# Patient Record
Sex: MALE
[De-identification: names, ages, dates, MRNs, and addresses within clinical notes are randomized per-mention and may not be internally consistent; named-entity substitution may affect disease eponyms.]

---

## 2018-12-11 ENCOUNTER — HOSPITAL ENCOUNTER (INPATIENT)
Dept: HOSPITAL 88 - ER | Age: 73
LOS: 12 days | Discharge: HOME | DRG: 271 | End: 2018-12-23
Attending: INTERNAL MEDICINE | Admitting: INTERNAL MEDICINE
Payer: COMMERCIAL

## 2018-12-11 VITALS — SYSTOLIC BLOOD PRESSURE: 135 MMHG | DIASTOLIC BLOOD PRESSURE: 72 MMHG

## 2018-12-11 VITALS — SYSTOLIC BLOOD PRESSURE: 134 MMHG | DIASTOLIC BLOOD PRESSURE: 72 MMHG

## 2018-12-11 VITALS — WEIGHT: 194 LBS | BODY MASS INDEX: 28.73 KG/M2 | HEIGHT: 69 IN

## 2018-12-11 VITALS — DIASTOLIC BLOOD PRESSURE: 72 MMHG | SYSTOLIC BLOOD PRESSURE: 135 MMHG

## 2018-12-11 DIAGNOSIS — E11.42: ICD-10-CM

## 2018-12-11 DIAGNOSIS — E11.51: Primary | ICD-10-CM

## 2018-12-11 DIAGNOSIS — L03.031: ICD-10-CM

## 2018-12-11 DIAGNOSIS — Z85.038: ICD-10-CM

## 2018-12-11 DIAGNOSIS — I10: ICD-10-CM

## 2018-12-11 DIAGNOSIS — B96.5: ICD-10-CM

## 2018-12-11 DIAGNOSIS — L97.519: ICD-10-CM

## 2018-12-11 DIAGNOSIS — Z79.4: ICD-10-CM

## 2018-12-11 DIAGNOSIS — Z79.02: ICD-10-CM

## 2018-12-11 DIAGNOSIS — Z79.82: ICD-10-CM

## 2018-12-11 DIAGNOSIS — B96.89: ICD-10-CM

## 2018-12-11 DIAGNOSIS — L03.115: ICD-10-CM

## 2018-12-11 DIAGNOSIS — B95.2: ICD-10-CM

## 2018-12-11 DIAGNOSIS — I70.235: ICD-10-CM

## 2018-12-11 DIAGNOSIS — E11.621: ICD-10-CM

## 2018-12-11 DIAGNOSIS — E78.5: ICD-10-CM

## 2018-12-11 DIAGNOSIS — L02.611: ICD-10-CM

## 2018-12-11 LAB
ALBUMIN SERPL-MCNC: 3.9 G/DL (ref 3.5–5)
ALBUMIN/GLOB SERPL: 1 {RATIO} (ref 0.8–2)
ALP SERPL-CCNC: 74 IU/L (ref 40–150)
ALT SERPL-CCNC: 22 IU/L (ref 0–55)
ANION GAP SERPL CALC-SCNC: 17.1 MMOL/L (ref 8–16)
BASOPHILS # BLD AUTO: 0.1 10*3/UL (ref 0–0.1)
BASOPHILS NFR BLD AUTO: 0.5 % (ref 0–1)
BUN SERPL-MCNC: 25 MG/DL (ref 7–26)
BUN/CREAT SERPL: 21 (ref 6–25)
CALCIUM SERPL-MCNC: 10.2 MG/DL (ref 8.4–10.2)
CHLORIDE SERPL-SCNC: 97 MMOL/L (ref 98–107)
CHOLEST SERPL-MCNC: 168 MD/DL (ref 0–199)
CHOLEST/HDLC SERPL: 2.7 {RATIO} (ref 3.9–4.7)
CO2 SERPL-SCNC: 26 MMOL/L (ref 22–29)
DEPRECATED NEUTROPHILS # BLD AUTO: 7.2 10*3/UL (ref 2.1–6.9)
EGFRCR SERPLBLD CKD-EPI 2021: > 60 ML/MIN (ref 60–?)
EOSINOPHIL # BLD AUTO: 0.2 10*3/UL (ref 0–0.4)
EOSINOPHIL NFR BLD AUTO: 1.6 % (ref 0–6)
ERYTHROCYTE [DISTWIDTH] IN CORD BLOOD: 12.5 % (ref 11.7–14.4)
GLOBULIN PLAS-MCNC: 4.1 G/DL (ref 2.3–3.5)
GLUCOSE SERPLBLD-MCNC: 113 MG/DL (ref 74–118)
HCT VFR BLD AUTO: 44.3 % (ref 38.2–49.6)
HDLC SERPL-MSCNC: 62 MG/DL (ref 40–60)
HGB BLD-MCNC: 14.8 G/DL (ref 14–18)
LDLC SERPL CALC-MCNC: 86 MG/DL (ref 60–130)
LYMPHOCYTES # BLD: 1.1 10*3/UL (ref 1–3.2)
LYMPHOCYTES NFR BLD AUTO: 11.8 % (ref 18–39.1)
MCH RBC QN AUTO: 30.1 PG (ref 28–32)
MCHC RBC AUTO-ENTMCNC: 33.4 G/DL (ref 31–35)
MCV RBC AUTO: 90.2 FL (ref 81–99)
MONOCYTES # BLD AUTO: 1 10*3/UL (ref 0.2–0.8)
MONOCYTES NFR BLD AUTO: 10.4 % (ref 4.4–11.3)
NEUTS SEG NFR BLD AUTO: 75.4 % (ref 38.7–80)
PLATELET # BLD AUTO: 243 X10E3/UL (ref 140–360)
POTASSIUM SERPL-SCNC: 4.1 MMOL/L (ref 3.5–5.1)
RBC # BLD AUTO: 4.91 X10E6/UL (ref 4.3–5.7)
SODIUM SERPL-SCNC: 136 MMOL/L (ref 136–145)
TRIGL SERPL-MCNC: 101 MG/DL (ref 0–149)

## 2018-12-11 PROCEDURE — 80202 ASSAY OF VANCOMYCIN: CPT

## 2018-12-11 PROCEDURE — 87205 SMEAR GRAM STAIN: CPT

## 2018-12-11 PROCEDURE — 80048 BASIC METABOLIC PNL TOTAL CA: CPT

## 2018-12-11 PROCEDURE — 87071 CULTURE AEROBIC QUANT OTHER: CPT

## 2018-12-11 PROCEDURE — 75716 ARTERY X-RAYS ARMS/LEGS: CPT

## 2018-12-11 PROCEDURE — 85025 COMPLETE CBC W/AUTO DIFF WBC: CPT

## 2018-12-11 PROCEDURE — 36247 INS CATH ABD/L-EXT ART 3RD: CPT

## 2018-12-11 PROCEDURE — 82948 REAGENT STRIP/BLOOD GLUCOSE: CPT

## 2018-12-11 PROCEDURE — 80053 COMPREHEN METABOLIC PANEL: CPT

## 2018-12-11 PROCEDURE — 37232: CPT

## 2018-12-11 PROCEDURE — 75625 CONTRAST EXAM ABDOMINL AORTA: CPT

## 2018-12-11 PROCEDURE — 96372 THER/PROPH/DIAG INJ SC/IM: CPT

## 2018-12-11 PROCEDURE — 37229: CPT

## 2018-12-11 PROCEDURE — 85651 RBC SED RATE NONAUTOMATED: CPT

## 2018-12-11 PROCEDURE — 87040 BLOOD CULTURE FOR BACTERIA: CPT

## 2018-12-11 PROCEDURE — 83605 ASSAY OF LACTIC ACID: CPT

## 2018-12-11 PROCEDURE — 99284 EMERGENCY DEPT VISIT MOD MDM: CPT

## 2018-12-11 PROCEDURE — 80061 LIPID PANEL: CPT

## 2018-12-11 PROCEDURE — 96367 TX/PROPH/DG ADDL SEQ IV INF: CPT

## 2018-12-11 PROCEDURE — 36415 COLL VENOUS BLD VENIPUNCTURE: CPT

## 2018-12-11 PROCEDURE — 87186 SC STD MICRODIL/AGAR DIL: CPT

## 2018-12-11 PROCEDURE — 96361 HYDRATE IV INFUSION ADD-ON: CPT

## 2018-12-11 PROCEDURE — 87075 CULTR BACTERIA EXCEPT BLOOD: CPT

## 2018-12-11 PROCEDURE — 93926 LOWER EXTREMITY STUDY: CPT

## 2018-12-11 PROCEDURE — 37186 SEC ART THROMBECTOMY ADD-ON: CPT

## 2018-12-11 PROCEDURE — 83036 HEMOGLOBIN GLYCOSYLATED A1C: CPT

## 2018-12-11 RX ADMIN — VANCOMYCIN HYDROCHLORIDE SCH MLS/HR: 1 INJECTION, SOLUTION INTRAVENOUS at 20:44

## 2018-12-11 RX ADMIN — INSULIN LISPRO SCH UNIT: 100 INJECTION, SOLUTION INTRAVENOUS; SUBCUTANEOUS at 21:30

## 2018-12-11 RX ADMIN — TAZOBACTAM SODIUM AND PIPERACILLIN SODIUM SCH MLS/HR: 375; 3 INJECTION, SOLUTION INTRAVENOUS at 18:00

## 2018-12-11 RX ADMIN — SODIUM CHLORIDE SCH MLS/HR: 9 INJECTION, SOLUTION INTRAVENOUS at 18:00

## 2018-12-11 NOTE — NUR
PT ARRIVED AT  THE UNIT FROM ER [facility]RETCHER WITH  C/O R .GREAT TOE PAIN. UNSTAGEABLE 
INFECTED WOUND NOTED @ R.GREAT TOE.NO PAIN VOICED.RED DISCOLORATION NOTED @ 
R.FOOT.ASSESSMENT DONE.NO RESP.DISTRESS.ORIENTED THE PT TO THE UNIT.RECEIVED ORDERS TO GET 
RENEW THE HOME MEDICATION.INFORMED THE CONSULTS.FAMILY MEMBER AT BED SIDE.BED LOCKED AND IN 
LOWEST POSITION.PHONE AND CALL LIGHT WITHIN REACH.INSTRUCTED TO CALL FOR ASSISTANCE AS 
NEEDED.KEEP MONITORING THE PT.

## 2018-12-11 NOTE — XMS REPORT
Patient Summary Document

                             Created on: 2018



DOMINGUEZ YU

External Reference #: [id number]

: 1945

Sex: Male



Demographics







                          Address                   [address] [place] [address]

 

                          Home Phone                [phone]

 

                          Preferred Language        Unknown

 

                          Marital Status            Unknown

 

                          Orthodoxy Affiliation     Unknown

 

                          Race                      Unknown

 

                          Ethnic Group              Unknown





Author







                          Author                    [facility]

 

                          Address                   Unknown

 

                          Phone                     Unavailable







Care Team Providers







                    Care Team Member Name    Role                Phone

 

                    JORY ROMERO    Unavailable         Unavailable







Problems

This patient has no known problems.



Allergies, Adverse Reactions, Alerts

This patient has no known allergies or adverse reactions.



Medications

This patient has no known medications.



Results







           Test Description    Test Time    Test Comments    Text Results    Atomic Results    Result

 Comments

 

                FOOT RIGHT COMPLETE    2018 14:52:00                                                       

                                                   [place] [address]      Patient Name: DOMINGUEZ YU                                
  MR #: [id number]                     : 1945                          
        Age/Sex: 73/M  Acct #: [id number]                              Req #: 
[id number]  Adm Physician:                                                      
Ordered by: LUZ MARIA ROMERO MD                            Report #: [id number]
       Location: ER                                      Room/Bed:              
      
___________________________________________________________________________________________________
   Procedure: [id number] DX/FOOT RIGHT COMPLETE  Exam Date:                      
      Exam Time:                                               REPORT STATUS: 
Signed       Exam:  Right foot, 3 views      History:  Diabetic foot ulcer great
toe      Comparison: None.      Findings: No acute, displaced fracture or 
dislocation. There is cortical   erosion of the subungual tuft of the first 
distal phalanx. Joint spaces are   well-maintained. Soft tissues are notable for
mild the first digit soft tissue   swelling and atherosclerotic vascular 
calcifications.      Impression:      Soft tissue swelling with osteomyelitis of
the distal first phalanx.         Signed by: Dr. Artemio Morse M.D. on 
2018 2:54 PM        Dictated By: ARTEMIO MORSE MD  Electronically Signed 
By: ARTEMIO MORSE MD on 18  Transcribed By: MARY on 18 
     COPY TO:   LUZ MARIA ROMERO MD

## 2018-12-11 NOTE — DIAGNOSTIC IMAGING REPORT
Exam:  Right foot, 3 views



History:  Diabetic foot ulcer great toe



Comparison: None.



Findings: No acute, displaced fracture or dislocation. There is cortical

erosion of the subungual tuft of the first distal phalanx. Joint spaces are

well-maintained. Soft tissues are notable for mild the first digit soft tissue

swelling and atherosclerotic vascular calcifications.



Impression:



Soft tissue swelling with osteomyelitis of the distal first phalanx.





Signed by: Dr. Myke Hernandez M.D. on 12/11/2018 2:54 PM

## 2018-12-11 NOTE — NUR
The patient is AA&Ox4.  The  was contacted, to request a visit with the 
family.  The  will see the patient in the AM first thing.  The patient 
is eating a meal as well.  The admitting MD is at the bedside to consult with 
the patient.  All fluids and zosyn given as ordered.  The patient refused 
narcotics and anti-emetics.  Pt states he is not hurting or nauseous.

## 2018-12-12 VITALS — DIASTOLIC BLOOD PRESSURE: 68 MMHG | SYSTOLIC BLOOD PRESSURE: 125 MMHG

## 2018-12-12 VITALS — SYSTOLIC BLOOD PRESSURE: 122 MMHG | DIASTOLIC BLOOD PRESSURE: 73 MMHG

## 2018-12-12 VITALS — SYSTOLIC BLOOD PRESSURE: 125 MMHG | DIASTOLIC BLOOD PRESSURE: 68 MMHG

## 2018-12-12 VITALS — SYSTOLIC BLOOD PRESSURE: 132 MMHG | DIASTOLIC BLOOD PRESSURE: 73 MMHG

## 2018-12-12 VITALS — SYSTOLIC BLOOD PRESSURE: 127 MMHG | DIASTOLIC BLOOD PRESSURE: 67 MMHG

## 2018-12-12 VITALS — DIASTOLIC BLOOD PRESSURE: 58 MMHG | SYSTOLIC BLOOD PRESSURE: 117 MMHG

## 2018-12-12 VITALS — DIASTOLIC BLOOD PRESSURE: 67 MMHG | SYSTOLIC BLOOD PRESSURE: 127 MMHG

## 2018-12-12 LAB
ALBUMIN SERPL-MCNC: 3.2 G/DL (ref 3.5–5)
ALBUMIN/GLOB SERPL: 1 {RATIO} (ref 0.8–2)
ALP SERPL-CCNC: 62 IU/L (ref 40–150)
ALT SERPL-CCNC: 18 IU/L (ref 0–55)
ANION GAP SERPL CALC-SCNC: 15.4 MMOL/L (ref 8–16)
BASOPHILS # BLD AUTO: 0 10*3/UL (ref 0–0.1)
BASOPHILS NFR BLD AUTO: 0.2 % (ref 0–1)
BUN SERPL-MCNC: 22 MG/DL (ref 7–26)
BUN/CREAT SERPL: 21 (ref 6–25)
CALCIUM SERPL-MCNC: 9.1 MG/DL (ref 8.4–10.2)
CHLORIDE SERPL-SCNC: 99 MMOL/L (ref 98–107)
CO2 SERPL-SCNC: 23 MMOL/L (ref 22–29)
DEPRECATED NEUTROPHILS # BLD AUTO: 6.3 10*3/UL (ref 2.1–6.9)
EGFRCR SERPLBLD CKD-EPI 2021: > 60 ML/MIN (ref 60–?)
EOSINOPHIL # BLD AUTO: 0.2 10*3/UL (ref 0–0.4)
EOSINOPHIL NFR BLD AUTO: 2.1 % (ref 0–6)
ERYTHROCYTE [DISTWIDTH] IN CORD BLOOD: 12.4 % (ref 11.7–14.4)
GLOBULIN PLAS-MCNC: 3.3 G/DL (ref 2.3–3.5)
GLUCOSE SERPLBLD-MCNC: 171 MG/DL (ref 74–118)
HCT VFR BLD AUTO: 39.2 % (ref 38.2–49.6)
HGB BLD-MCNC: 13.1 G/DL (ref 14–18)
LYMPHOCYTES # BLD: 0.9 10*3/UL (ref 1–3.2)
LYMPHOCYTES NFR BLD AUTO: 11 % (ref 18–39.1)
MCH RBC QN AUTO: 30 PG (ref 28–32)
MCHC RBC AUTO-ENTMCNC: 33.4 G/DL (ref 31–35)
MCV RBC AUTO: 89.9 FL (ref 81–99)
MONOCYTES # BLD AUTO: 0.8 10*3/UL (ref 0.2–0.8)
MONOCYTES NFR BLD AUTO: 9.6 % (ref 4.4–11.3)
NEUTS SEG NFR BLD AUTO: 76.9 % (ref 38.7–80)
PLATELET # BLD AUTO: 193 X10E3/UL (ref 140–360)
POTASSIUM SERPL-SCNC: 4.4 MMOL/L (ref 3.5–5.1)
RBC # BLD AUTO: 4.36 X10E6/UL (ref 4.3–5.7)
SODIUM SERPL-SCNC: 133 MMOL/L (ref 136–145)

## 2018-12-12 RX ADMIN — VANCOMYCIN HYDROCHLORIDE SCH MLS/HR: 1 INJECTION, SOLUTION INTRAVENOUS at 19:32

## 2018-12-12 RX ADMIN — INSULIN LISPRO SCH UNIT: 100 INJECTION, SOLUTION INTRAVENOUS; SUBCUTANEOUS at 12:30

## 2018-12-12 RX ADMIN — SODIUM CHLORIDE SCH MLS/HR: 9 INJECTION, SOLUTION INTRAVENOUS at 00:38

## 2018-12-12 RX ADMIN — TAZOBACTAM SODIUM AND PIPERACILLIN SODIUM SCH MLS/HR: 375; 3 INJECTION, SOLUTION INTRAVENOUS at 02:00

## 2018-12-12 RX ADMIN — LOSARTAN POTASSIUM SCH MG: 25 TABLET, FILM COATED ORAL at 21:00

## 2018-12-12 RX ADMIN — LEVOTHYROXINE SODIUM SCH MCG: 50 TABLET ORAL at 05:40

## 2018-12-12 RX ADMIN — TAZOBACTAM SODIUM AND PIPERACILLIN SODIUM SCH MLS/HR: 375; 3 INJECTION, SOLUTION INTRAVENOUS at 10:00

## 2018-12-12 RX ADMIN — Medication SCH MG: at 21:00

## 2018-12-12 RX ADMIN — INSULIN LISPRO SCH UNIT: 100 INJECTION, SOLUTION INTRAVENOUS; SUBCUTANEOUS at 11:30

## 2018-12-12 RX ADMIN — INSULIN ASPART SCH UNITS: 100 INJECTION, SUSPENSION SUBCUTANEOUS at 16:30

## 2018-12-12 RX ADMIN — PRIMIDONE SCH MG: 50 TABLET ORAL at 10:00

## 2018-12-12 RX ADMIN — INSULIN LISPRO SCH UNIT: 100 INJECTION, SOLUTION INTRAVENOUS; SUBCUTANEOUS at 16:30

## 2018-12-12 RX ADMIN — INSULIN ASPART SCH UNITS: 100 INJECTION, SUSPENSION SUBCUTANEOUS at 10:00

## 2018-12-12 RX ADMIN — INSULIN ASPART SCH UNITS: 100 INJECTION, SUSPENSION SUBCUTANEOUS at 21:00

## 2018-12-12 RX ADMIN — INSULIN LISPRO SCH UNIT: 100 INJECTION, SOLUTION INTRAVENOUS; SUBCUTANEOUS at 21:00

## 2018-12-12 RX ADMIN — INSULIN LISPRO SCH UNIT: 100 INJECTION, SOLUTION INTRAVENOUS; SUBCUTANEOUS at 07:30

## 2018-12-12 RX ADMIN — PRIMIDONE SCH MG: 50 TABLET ORAL at 18:21

## 2018-12-12 RX ADMIN — TAZOBACTAM SODIUM AND PIPERACILLIN SODIUM SCH MLS/HR: 375; 3 INJECTION, SOLUTION INTRAVENOUS at 18:21

## 2018-12-12 RX ADMIN — VANCOMYCIN HYDROCHLORIDE SCH MLS/HR: 1 INJECTION, SOLUTION INTRAVENOUS at 05:40

## 2018-12-12 NOTE — CONSULTATION
DATE OF CONSULTATION:  December 11, 2018 



PODIATRY CONSULTATION 



REASON FOR CONSULTATION:  Pain to the right foot with pregangrenous changes 

noted to the right great toe.



HISTORY OF PRESENT ILLNESS:  This is a pleasant 73-year-old  male 

with a history of insulin-dependent diabetes, hypertension, 

hypercholesterolemia, who relates that a piece of metal fell on his right 

great toe 3 weeks ago.  He ignored it due to the fact that he has no 

feeling.  The foot started hurting and the right great toe started smelling 

very bad according to the wife who was present.  He is currently denying 

any history of fever, chills, nausea or vomiting.  He does relate some 

discomfort to the forefoot aspect of the right foot overlying the distal 

aspect of the right great toe and also medial aspect of the 1st 

metatarsophalangeal joint.



PAST MEDICAL HISTORY:  As described above.  Insulin-dependent diabetes, 

hypertension, hypercholesterolemia.



PAST SURGICAL HISTORY:  Remarkable for a partial resection of colon 

secondary to colon cancer.



ALLERGIES:  PATIENT DENIES.



SOCIAL HISTORY:  Denies any smoking, drinking or recreational drug use.  

Lives with his current wife.  Has 5 kids on a previous marriage.



FAMILY HISTORY:  Remarkable for diabetes.



CURRENT MEDICATIONS:  Note listed in the chart including IV vancomycin and 

Zosyn.  Also on morphine sulfate 4 mg IV piggyback every 4 to 6 hours 

p.r.n. pain.



REVIEW OF SYSTEMS:   

CARDIAC:  He is denying any palpitations or arrhythmias. 

RESPIRATORY:  Denies any shortness of breath, productive cough. 

GASTROINTESTINAL:  Denies any diarrhea, constipation. 

GENITOURINARY:  Denies hematuria or problems voiding. 

 

VITALS:  Afebrile, pulse rate 86, respirations 17, blood pressure 135/72, 

O2 saturation 99%.



LABS:  Noted.  Has a white blood cell count of 9.5, hemoglobin 14.8, 

hematocrit 44.3 with a platelet count of 243.



PODIATRIC PHYSICAL EXAMINATION:  Reveals the following. 

VASCULATURE:  Pedal pulses to both the dorsalis pedis and posterior tibial 

arteries are barely palpable.  Skin temperature between warm and cool to 

touch. 

NEUROLOGICAL  Reveals a complete loss of protective sensation when 

utilizing [employer] 5.07 monofilament wire. 

MUSCULOSKELETAL  Reveals muscle mass to be symmetrical, muscle strength to 

be 4 to 5 out of 5 to all muscle groups.  Has some erythema surrounding the 

1st metatarsophalangeal joint with pain upon palpation. 

DERMATOLOGICAL  Reveals pregangrenous changes with foul smell noted with a 

discolored right great toe and swollen when compared to contralateral side. 





X RAYS:  Were reviewed, revealing possible osteolysis to the distal phalanx 

which may be consistent with osteomyelitis.  May also have a stress 

fracture secondary to a contusion.  Has cyst degeneration overlying the 1st 

metatarsal head medially with calcification of vessels noted.



ASSESSMENT:  Peripheral arterial disease with cellulitis to the dorsal 

aspect of the right foot.  Pregangrenous changes noted.  Diabetic 

neuropathy with mild hallux valgus deformity.



PLAN:  Will start diluted wet-to-dry Betadine dressing.  Noninvasive 

arterial Doppler studies/ultrasound will be reviewed.  Patient will need to 

have an MRI.  Will treat him conservatively.  Patient understands if not 

responsive to conservative treatment and depending on his arterial 

ultrasound, may need angioplasties before any surgery is attempted.  

Patient understands that there is a possibility of losing the toe if it 

does not respond to IV and local wound care for now.  



 





DD:  12/12/2018 09:11

DT:  12/12/2018 09:50

Job#:  [id number] SCOTT

## 2018-12-12 NOTE — NUR
ASSESSMENT: Spiritual Distress

Referred by ED RN. Pt is scared and feels abandoned by sons. Pt states his mother suffered 
from the same illness and had bilateral amputation. Pt afraid the same will happen to him. 
Pt states his sons have stopped communicating with him. Pt expressed emotions thru words and 
tears.



Intervention:  Provided hospitality and empathic listening. Facilitated identification of 
emotions. Provided prayer.



Outcome: Pt expressed appreciation for visit. Will follow as able.





YULIA RAO



Spiritual Care Department

O: [phone]

Pager: [phone] ([phone])

## 2018-12-12 NOTE — HISTORY AND PHYSICAL
_______ ulcer and right foot cellulitis.



HISTORY OF PRESENT ILLNESS:  A 73-year-old man with a long history of 

diabetes, on insulin and oral hypoglycemic medications.  The patient has 

diabetic neuropathy.  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 

dropped on his right foot a couple of weeks ago and worsened.  He did not 

notice the fact that his right great toe developed an ulcer.  It is 

infected.  There is some necrotic tissue.  The patient was admitted at this 

for _______.  The patient is otherwise stable.  _______ _______.



PAST MEDICAL HISTORY:  Diabetes, on insulin therapy, hypertension, 

hypothyroidism, diabetic neuropathy.



PAST SURGICAL HISTORY:  Partial wound resection approximately 3 years ago 

for _______, radiation and chemotherapy.



ALLERGIES:  NO KNOWN ALLERGIES.



MEDICATIONS:  List reviewed.  Gabapentin, levothyroxine _______, _______.



PHYSICAL EXAMINATION

VITAL SIGNS:  Temperature is 98, blood pressure 138/74, pulse rate of 92, 

respirations 18. _______ _______.

HEENT:  Head is normocephalic and atraumatic.

NECK:  Supple grossly.

PULMONARY:  Clear.

CARDIOVASCULAR:  Regular rate and rhythm.  

ABDOMEN:  Soft. 

EXTREMITIES:  Right greater toe infected with ulcer with necrotic tissue.  

Right foot cellulitis as well.  Pulses intact.

NEUROLOGIC:  Diabetic neuropathy _______.





LABORATORY STUDIES:  Sodium is 133, potassium 4.1, chloride 97, bicarb 26, 

BUN 25, creatinine 1.1, glucose 113.  WBC is 9.95, hemoglobin 14.8, 

hematocrit 44.6, and platelets of 243,000.  



Sed rate is 41.  



Right foot x-ray shows soft tissue swelling with osteomyelitis of the 

distal 1st phalanx.



IMPRESSION 

1.  Right 1st phalanx right great toe osteomyelitis with infected diabetic 

foot ulcer with necrotic tissue and swelling:  There is also right foot 

cellulitis as well.

2.  Diabetes, type 2:  On insulin therapy.



PLAN:  IV antibiotics.  Tighter control of blood sugar.  MRI of the right 

foot.  If confirmed, the patient will need right great toe amputation.  

_______ consulted. 









DD:  12/12/2018 08:43

DT:  12/12/2018 08:49

Job#:  [id number]

## 2018-12-12 NOTE — NUR
Nutrition Screen Note



RD Recommendation for Physician: 

-Continue ADA diet as ordered

-Pt and family are not interested in diet education  12/12



Plan of Care: RD following, monitoring for tolerance and adequacy



Nutrition reason for involvement:

Nutrition risk trigger  MST 



Primary Diagnose(s):

1.Peripheral arterial disease with cellulitis to the dorsal aspect of the right foot.  

2.Pregangrenous changes noted.  

3.Diabetic neuropathy with mild hallux valgus deformity.



PMH: Diabetes, on insulin therapy, hypertension, hypothyroidism, diabetic neuropathy.



Ht: 69in 

Wt: 190lb

BMI: 28.1kg/m2

IBW: 160lb



RD Assessment:

(12/12) Chart reviewed. Labs and meds reviewed. 74 yo M, who is admitted for R foot 
cellulitis and infection. During my visit, pt reports good appetite and denies eating less 
than usual PTA. No GI complains noted. LBM  12/12. Pt denies any chewing or swallowing 
difficulty. No recent weight loss reported. Pt has had hx of DM for over 30years and has 
been f/u with his endocrinologist for DM management. Pt and family are not interested in any 
further education. Will continue to monitor and follow. 



Current Diet: ADA diet 



Malnutrition Evaluation (12/12/2018)

The patient does not meet criteria for a specified degree of malnutrition at this time. Will 
re-evaluate at follow-up as appropriate. 



Diet Education Needs Assessment:

Diet education indicated, pt and family are not interested. 





Nutrition Care Level: low 





Signed: Tiffanie Valverde, MS, RD, LD

## 2018-12-12 NOTE — NUR
Completed rounds with morning nurse at bedside. Pt stable lying in bed 45 degrees. Call bell 
within reach. Family at bedside. No acute distress noted.

## 2018-12-12 NOTE — DIAGNOSTIC IMAGING REPORT
TECHNIQUE:

Magnetic resonance imaging of the RIGHT foot (forefoot) was performed WITHOUT

injected contrast. 



HISTORY: Right great toe osteomyelitis, pain, heavy object fell on toe, one

week ago, per the provided clinical note, signs of gangrene right great toe 

COMPARISON: Right foot radiographs December 11, 2018



DISCUSSION: 

Bone:  

Diffuse edema involving the distal phalanx of the great toe with multiple

intrinsic heterogeneity (series 4 image 13 and series 5 image 13), but in

general relative preservation of the fatty marrow signal.



Joints:  

Mild degenerative changes of the first metatarsophalangeal joint.

No effusion.



Soft Tissues:  

Soft tissue defect at the distal medial aspect of the great toe. 

Less than expected edema of the great toe soft tissues. 

Enhancement characteristics cannot be assessed.

Edema and atrophy of the intrinsic muscles of the foot.





IMPRESSION: 

First distal phalanx findings are somewhat nonspecific, the absence of

intravenous contrast limits this evaluation. Differential considerations

include a comminuted nondisplaced fracture of the distal phalanx without

superimposed osteomyelitis or gangrene of the distal great toe with mummified

fat throughout the distal phalanx.  If further imaging characterization is

desired and the patient can receive intravenous contrast, follow-up MRI images

of the great toe with and without contrast would be helpful.



Signed by: Dr. Gregory Olson D.O., M.M.M. on 12/12/2018 12:14 PM

## 2018-12-12 NOTE — PROGRESS NOTE
DATE:  December 12, 2018 



SUBJECTIVE:  Patient was seen at bedside accompanied by wife.  Doing 

somewhat better.  Denies any history of fever, chills, nausea, or vomiting. 

 



OBJECTIVE   

VITALS:  Afebrile, pulse rate 77, respirations 18, blood pressure 125/68, 

O2 saturation 96%. 

EXTREMITIES:  Still some foul smell noted to the right great toe.  Some 

discoloration.  The cellulitis of the dorsal aspect of the right foot seems 

to be resolving with the IV antibiotics.  Has some pain overlying the 1st 

metatarsophalangeal joint, right foot when compared to the left.



LABS:  Show white blood cells dropping to 8.2, hemoglobin 13.1 with a 

platelet count of 193,000.  



ASSESSMENT

1.  Possible osteomyelitis.  

2.  Pregangrenous changes noted.  

3.  Peripheral artery disease with diabetic neuropathy and 

capsulitis/hallux valgus deformity.



PLAN:  Will continue dilute wet-to-dry Betadine.  Continue IV antibiotics.  

MRI will be ordered and will be done today.  Will treat conservatively for 

now.  Patient understands possible surgical intervention.  Will let the 

foot demarcate before any surgery is attempted to try to salvage as much as 

possible.  







DD:  12/12/2018 09:13

DT:  12/12/2018 09:24

Job#:  [id number]

## 2018-12-13 VITALS — SYSTOLIC BLOOD PRESSURE: 122 MMHG | DIASTOLIC BLOOD PRESSURE: 58 MMHG

## 2018-12-13 VITALS — DIASTOLIC BLOOD PRESSURE: 68 MMHG | SYSTOLIC BLOOD PRESSURE: 130 MMHG

## 2018-12-13 VITALS — SYSTOLIC BLOOD PRESSURE: 130 MMHG | DIASTOLIC BLOOD PRESSURE: 71 MMHG

## 2018-12-13 VITALS — SYSTOLIC BLOOD PRESSURE: 130 MMHG | DIASTOLIC BLOOD PRESSURE: 63 MMHG

## 2018-12-13 VITALS — SYSTOLIC BLOOD PRESSURE: 141 MMHG | DIASTOLIC BLOOD PRESSURE: 70 MMHG

## 2018-12-13 VITALS — DIASTOLIC BLOOD PRESSURE: 73 MMHG | SYSTOLIC BLOOD PRESSURE: 135 MMHG

## 2018-12-13 RX ADMIN — TAZOBACTAM SODIUM AND PIPERACILLIN SODIUM SCH MLS/HR: 375; 3 INJECTION, SOLUTION INTRAVENOUS at 10:17

## 2018-12-13 RX ADMIN — PRIMIDONE SCH MG: 50 TABLET ORAL at 10:17

## 2018-12-13 RX ADMIN — LEVOTHYROXINE SODIUM SCH MCG: 50 TABLET ORAL at 06:34

## 2018-12-13 RX ADMIN — SODIUM CHLORIDE SCH MLS/HR: 9 INJECTION, SOLUTION INTRAVENOUS at 16:35

## 2018-12-13 RX ADMIN — ENOXAPARIN SODIUM SCH MG: 40 INJECTION SUBCUTANEOUS at 17:38

## 2018-12-13 RX ADMIN — SODIUM CHLORIDE SCH MLS/HR: 9 INJECTION, SOLUTION INTRAVENOUS at 00:00

## 2018-12-13 RX ADMIN — INSULIN ASPART SCH UNITS: 100 INJECTION, SUSPENSION SUBCUTANEOUS at 21:00

## 2018-12-13 RX ADMIN — VANCOMYCIN HYDROCHLORIDE SCH MLS/HR: 1 INJECTION, SOLUTION INTRAVENOUS at 06:34

## 2018-12-13 RX ADMIN — TAZOBACTAM SODIUM AND PIPERACILLIN SODIUM SCH MLS/HR: 375; 3 INJECTION, SOLUTION INTRAVENOUS at 17:57

## 2018-12-13 RX ADMIN — INSULIN LISPRO SCH UNIT: 100 INJECTION, SOLUTION INTRAVENOUS; SUBCUTANEOUS at 11:30

## 2018-12-13 RX ADMIN — LOSARTAN POTASSIUM SCH MG: 25 TABLET, FILM COATED ORAL at 21:00

## 2018-12-13 RX ADMIN — METOPROLOL SUCCINATE SCH MG: 25 TABLET, EXTENDED RELEASE ORAL at 16:33

## 2018-12-13 RX ADMIN — PRIMIDONE SCH MG: 50 TABLET ORAL at 17:38

## 2018-12-13 RX ADMIN — INSULIN LISPRO SCH UNIT: 100 INJECTION, SOLUTION INTRAVENOUS; SUBCUTANEOUS at 07:30

## 2018-12-13 RX ADMIN — Medication SCH MG: at 21:00

## 2018-12-13 RX ADMIN — Medication SCH MG: at 16:33

## 2018-12-13 RX ADMIN — INSULIN LISPRO SCH UNIT: 100 INJECTION, SOLUTION INTRAVENOUS; SUBCUTANEOUS at 16:30

## 2018-12-13 RX ADMIN — TAZOBACTAM SODIUM AND PIPERACILLIN SODIUM SCH MLS/HR: 375; 3 INJECTION, SOLUTION INTRAVENOUS at 02:58

## 2018-12-13 RX ADMIN — VANCOMYCIN HYDROCHLORIDE SCH MLS/HR: 1 INJECTION, SOLUTION INTRAVENOUS at 18:36

## 2018-12-13 RX ADMIN — ASPIRIN 81 MG CHEWABLE TABLET SCH MG: 81 TABLET CHEWABLE at 16:32

## 2018-12-13 RX ADMIN — INSULIN LISPRO SCH UNIT: 100 INJECTION, SOLUTION INTRAVENOUS; SUBCUTANEOUS at 21:00

## 2018-12-13 NOTE — NUR
REPORT RECEIVED FROM OFF GOING NURSE ,PT RESTING IN BED ALERT AND ORIENTED, NO DISTRESS 
NOTED, DENIES NEEDS, CALL LIGHT IN REACH, SITTER AT BEDSIDE

## 2018-12-13 NOTE — NUR
PATIENT IS BEEN TRANSFER TO #[id number], REPORT GIVEN TO THE RECEIVING NURSE. ALL PERSONAL TAKEN 
WITH THE PATIENT, HE LEFT UNIT PER WHEELCHAIR, ACCOMPANIED BY STAFF.

## 2018-12-13 NOTE — NUR
PT RECEIVED FROM MED SURG 1 VIA W/C , AAOX4, ORIENTED TO HIS ROOM, NO S/S OF DISTRESS NOTED, 
DENIES PAIN. RT AC 20G, VANCOMYCIN INFUSING, IV SITE CLEAN AND DRY. RT GREAT TOE CELLULITIS 
NOTED. GENERAL SKIN INTACT. IN ROOM WITH SPOUSE, CALL LIGHT WITHIN REACH.

## 2018-12-13 NOTE — NUR
WET TO DRY WOUND CARE WITH BETADINE SOLUTION APPLIED TO THE RIGHT GREAT TOE, PATIENT 
TOLERATED PROCEDURE WELL, HE DENIES PAIN. ODOR NOTED TO THE TOE WHILE PROVIDING CARE; CALL 
LIGHT WITHIN EASY REACH, INSTRUCTED TO CALL FOR ASSISTANCE AS NEEDED.

## 2018-12-13 NOTE — CONSULTATION
DATE OF CONSULTATION:  December 13, 2018 



CARDIOLOGY CONSULTATION



REASON FOR CONSULTATION:  Peripheral arterial disease.



HISTORY OF PRESENT ILLNESS:  Mr. Pope is a pleasant 73-year-old man 

with history of dyslipidemia, hypertension, neuropathy, who presents to [facility] with right first toe erythema, black 

discoloration, foul smell, associated to blunt trauma while walking.  In 

the setting of a neuropathy, he did not realize he developed an ulcer for 

an unclear amount of time and has been dealing with this for at least 

2 weeks.  He underwent unilateral arterial Doppler of the right lower 

extremity revealing monophasic waveforms of the anterior tibial and 

posterior tibial arteries as well as elevated velocities on the right 

anterior tibial concerning for outflow disease.  He has received initial 

antibiotic therapy as well as IV fluids, initially presenting with elevated 

lactic acid concerning for sepsis with improvement with initial therapy.  

He is being evaluated by podiatry with plans for additional surgical 

revision of the infected foot.  Underwent MRI study today, please see 

separate report.



TWELVE-SYSTEM REVIEW:  Negative except for as noted above.



ALLERGIES:  NO KNOWN DRUG ALLERGIES.



PAST MEDICAL HISTORY:  Diabetes, hypertension, dyslipidemia, and 

neuropathy.



SOCIAL HISTORY:  No active smoking, alcohol, or drugs.



FAMILY HISTORY:  Noncontributory.



PHYSICAL EXAMINATION:

VITAL SIGNS:  Temperature is 97.2, heart rate 80, blood pressure 135/73, 

respiratory rate 18, O2 sat 95% on room air.  BMI is 29.5.

GENERAL:  In no acute distress, alert.

NECK:  No JVD.

CHEST:  Clear to auscultation.

CARDIOVASCULAR:  Regular rate and rhythm.  Normal S1 and S2.  No S3 or S4.

ABDOMEN:  Soft, nontender.

EXTREMITIES:  No cyanosis or clubbing.  Has trace edema to bilateral lower 

extremities.  Right first toe erythema with ulceration and foul smell.  

Decreased pulses in pedal and dorsalis pedis bilaterally.



CARDIOVASCULAR MEDICATIONS:  Reviewed.  Losartan 50 mg daily.  On Zosyn and 

vancomycin antibiotic.



STUDIES:  Reviewed.  Potassium 4.4, creatinine 1.03.  Hemoglobin 13.1, 

platelets 193,000.  Normal AST of 14 and ALT of 18.



ASSESSMENT:

1. Peripheral vascular disease with critical limb ischemia and 

ulcer/cellulitis of right first digit and proximal forefoot (critical limb 

ischemia).

2. Diabetes mellitus type 2 with neuropathy.

3. Hypertension.

4. Dyslipidemia.

5. Sepsis.



RECOMMENDATIONS:  I have discussed at length alternatives, indications, 

risks and benefits for peripheral angiography and possible endovascular 

intervention.  Will initiate aspirin 81 mg daily, atorvastatin 40 mg 

nightly, initiate low-dose beta blocker for blood pressure optimization and 

cardiovascular protection perioperatively and schedule for peripheral 

angiography and possible intervention.  Patient voices understanding and 

agrees with plan of care.



DD:  12/13/2018 18:27 DT:  12/13/2018 19:10

Job#:  [id number] GUERRA

## 2018-12-13 NOTE — NUR
REPORT RECEIVED FROM OFF GOING NURSE, PT RESTING IN BED ALERT AND ORIENTED, IV INFUSING PER 
ORDER, PT VOICES UNDERSTANDING, ALL LIGHT IN REACH, INSTRUCTED TO CALL WITH NEEDS

## 2018-12-13 NOTE — PROGRESS NOTE
DATE:  December 13, 2018 



SUBJECTIVE:  Patient seen at bedside accompanied by spouse.  Doing better.  

Denies any history of fever, chills, nausea, or vomiting.  No pain to the 

right lower extremity secondary to his neuropathy.  Has erythema.



OBJECTIVE   

VITALS:  Afebrile, pulse rate 71, respirations 18, blood pressure 122/58, 

O2 saturation 96%. 

EXTREMITIES:  Has still some foul smell to the dorsal aspect of the right 

great toe secondary to pregangrenous changes.  Erythema surrounding the 1st 

metatarsophalangeal joint with pedal pulses diminished.  Cellulitis of the 

dorsal aspect of right foot is much better.  



MRI questionable for possible osteomyelitis to the distal phalanx.  Also, 

has a possible stress fracture secondary to the contusion. 



ASSESSMENT

1.  Peripheral artery disease.

2.  Diabetic neuropathy with cellulitis and pregangrenous changes noted to 

the right great toe.



PLAN:  Dr. John Mcmahon will be consulted for vascular evaluation.  Will 

continue IV antibiotics, such as vancomycin and Zosyn.  Will continue local 

wound care.  Ulceration to the distal aspect of the right great toe will be 

debrided tomorrow.  Possible nail avulsion may need to be done for 

debridement to the nail bed.  There may be some bone exposed.  Will 

continue to 

treat conservatively with IV antibiotics and local wound care.  Await Dr. Mcmahon to assess his circulation before any definitive procedure is done.







DD:  12/13/2018 08:57

DT:  12/13/2018 09:22

Job#:  [id number]

## 2018-12-13 NOTE — HISTORY AND PHYSICAL
PRIMARY CARE PHYSICIAN:  Dr. Chidi Powell



CONSULTANT:  Dr. Jesús Camp and Dr. John Navarrete



CHIEF COMPLAINT:  Right greater toe abscess, infected diabetic foot ulcer 

with cellulitis.



HISTORY:  The patient is a 73-year-old male with infection of the right 

foot with right greater toe infection and ulcer.  The patient works in a 

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  The patient developed 

this infection.  Two weeks ago his wife caught the infection and brought 

the patient to his family physician.  Subsequently, he was brought into the 

emergency room.  The patient is otherwise stable at this time.  



PAST MEDICAL HISTORY:  Diabetes, on insulin, hypertension.



PAST SURGICAL HISTORY:  Noncontributory.



SOCIAL HISTORY:  The patient does not smoke or use alcohol.  No 

recreational drugs.  



ALLERGIES:  NO KNOWN ALLERGIES. 



HOME MEDICATIONS:  List is reviewed.



REVIEW OF SYSTEMS:  Right greater toe infection.  Diabetic foot ulcer with 

redness and pain. 

PHYSICAL EXAMINATION 

VITAL SIGNS:  Temperature is 98, blood pressure 135/73, pulse rate is 80, 

respirations 18.

GENERAL:  The patient is in no acute distress.  He is awake.

HEENT:  Normocephalic, atraumatic and anicteric.

NECK:  Supple grossly.

PULMONARY:  Diminished breath sounds.

CARDIOVASCULAR:  S1 and S2.  Regular rate and rhythm. 

ABDOMEN:  Soft, nontender and nondistended.  

EXTREMITIES:  Right foot cellulitis.  Right greater toe abscess and 

necrotic tissue at the tip and also poor toenail care.  

NEUROLOGIC:  Diabetic neuropathy without any focal deficit.  



LABORATORY:  Sodium is 133, potassium 4.4, chloride 109, bicarb 23, BUN 22 

creatinine 1.03, glucose 171.  WBC is 8.2, hemoglobin 13.1, hematocrit 

39.2, and platelets are 193,000.



IMPRESSION

1.  Right infected diabetic greater toe ulcer associated with necrotic 

issues and swelling of right foot.

2.  Diabetes, type 2, on insulin therapy.

3.  Diabetic neuropathy.



PLAN:  Continue with IV antibiotics.  Vascular consultation.  Podiatry care 

with Dr. Jesús Camp. Will monitor the patient closely at this time. 









DD:  12/13/2018 15:12

DT:  12/13/2018 15:23

Job#:  [id number]

## 2018-12-13 NOTE — NUR
Pt anxious, c/o shakiness of the hands and feeling lightheaded. BS 109mg/dl. Pt drank 4oz 
orange juice, stated BS is lower that his usual BS level.

## 2018-12-14 VITALS — DIASTOLIC BLOOD PRESSURE: 68 MMHG | SYSTOLIC BLOOD PRESSURE: 148 MMHG

## 2018-12-14 VITALS — SYSTOLIC BLOOD PRESSURE: 112 MMHG | DIASTOLIC BLOOD PRESSURE: 57 MMHG

## 2018-12-14 VITALS — DIASTOLIC BLOOD PRESSURE: 67 MMHG | SYSTOLIC BLOOD PRESSURE: 137 MMHG

## 2018-12-14 VITALS — DIASTOLIC BLOOD PRESSURE: 70 MMHG | SYSTOLIC BLOOD PRESSURE: 143 MMHG

## 2018-12-14 VITALS — DIASTOLIC BLOOD PRESSURE: 60 MMHG | SYSTOLIC BLOOD PRESSURE: 144 MMHG

## 2018-12-14 VITALS — SYSTOLIC BLOOD PRESSURE: 109 MMHG | DIASTOLIC BLOOD PRESSURE: 56 MMHG

## 2018-12-14 VITALS — DIASTOLIC BLOOD PRESSURE: 62 MMHG | SYSTOLIC BLOOD PRESSURE: 147 MMHG

## 2018-12-14 VITALS — SYSTOLIC BLOOD PRESSURE: 152 MMHG | DIASTOLIC BLOOD PRESSURE: 74 MMHG

## 2018-12-14 VITALS — SYSTOLIC BLOOD PRESSURE: 126 MMHG | DIASTOLIC BLOOD PRESSURE: 69 MMHG

## 2018-12-14 VITALS — DIASTOLIC BLOOD PRESSURE: 69 MMHG | SYSTOLIC BLOOD PRESSURE: 126 MMHG

## 2018-12-14 VITALS — SYSTOLIC BLOOD PRESSURE: 136 MMHG | DIASTOLIC BLOOD PRESSURE: 67 MMHG

## 2018-12-14 VITALS — DIASTOLIC BLOOD PRESSURE: 71 MMHG | SYSTOLIC BLOOD PRESSURE: 141 MMHG

## 2018-12-14 PROCEDURE — 047P3ZZ DILATION OF RIGHT ANTERIOR TIBIAL ARTERY, PERCUTANEOUS APPROACH: ICD-10-PCS | Performed by: INTERNAL MEDICINE

## 2018-12-14 PROCEDURE — 0HDRXZZ EXTRACTION OF TOE NAIL, EXTERNAL APPROACH: ICD-10-PCS | Performed by: PODIATRIST

## 2018-12-14 PROCEDURE — 047T3ZZ DILATION OF RIGHT PERONEAL ARTERY, PERCUTANEOUS APPROACH: ICD-10-PCS | Performed by: INTERNAL MEDICINE

## 2018-12-14 PROCEDURE — 04CT3ZZ EXTIRPATION OF MATTER FROM RIGHT PERONEAL ARTERY, PERCUTANEOUS APPROACH: ICD-10-PCS | Performed by: INTERNAL MEDICINE

## 2018-12-14 PROCEDURE — B41D1ZZ FLUOROSCOPY OF AORTA AND BILATERAL LOWER EXTREMITY ARTERIES USING LOW OSMOLAR CONTRAST: ICD-10-PCS | Performed by: INTERNAL MEDICINE

## 2018-12-14 PROCEDURE — 0JBQ0ZZ EXCISION OF RIGHT FOOT SUBCUTANEOUS TISSUE AND FASCIA, OPEN APPROACH: ICD-10-PCS | Performed by: PODIATRIST

## 2018-12-14 RX ADMIN — ENOXAPARIN SODIUM SCH MG: 40 INJECTION SUBCUTANEOUS at 17:44

## 2018-12-14 RX ADMIN — TAZOBACTAM SODIUM AND PIPERACILLIN SODIUM SCH MLS/HR: 375; 3 INJECTION, SOLUTION INTRAVENOUS at 11:29

## 2018-12-14 RX ADMIN — INSULIN LISPRO SCH UNIT: 100 INJECTION, SOLUTION INTRAVENOUS; SUBCUTANEOUS at 21:00

## 2018-12-14 RX ADMIN — PRIMIDONE SCH MG: 50 TABLET ORAL at 09:00

## 2018-12-14 RX ADMIN — INSULIN LISPRO SCH UNIT: 100 INJECTION, SOLUTION INTRAVENOUS; SUBCUTANEOUS at 11:30

## 2018-12-14 RX ADMIN — INSULIN LISPRO SCH UNIT: 100 INJECTION, SOLUTION INTRAVENOUS; SUBCUTANEOUS at 07:30

## 2018-12-14 RX ADMIN — TAZOBACTAM SODIUM AND PIPERACILLIN SODIUM SCH MLS/HR: 375; 3 INJECTION, SOLUTION INTRAVENOUS at 04:19

## 2018-12-14 RX ADMIN — SODIUM CHLORIDE SCH MLS/HR: 9 INJECTION, SOLUTION INTRAVENOUS at 18:19

## 2018-12-14 RX ADMIN — VANCOMYCIN HYDROCHLORIDE SCH MLS/HR: 1 INJECTION, SOLUTION INTRAVENOUS at 19:20

## 2018-12-14 RX ADMIN — Medication SCH MG: at 12:49

## 2018-12-14 RX ADMIN — VANCOMYCIN HYDROCHLORIDE SCH MLS/HR: 1 INJECTION, SOLUTION INTRAVENOUS at 05:22

## 2018-12-14 RX ADMIN — INSULIN ASPART SCH UNITS: 100 INJECTION, SUSPENSION SUBCUTANEOUS at 21:00

## 2018-12-14 RX ADMIN — LEVOTHYROXINE SODIUM SCH MCG: 50 TABLET ORAL at 05:22

## 2018-12-14 RX ADMIN — LOSARTAN POTASSIUM SCH MG: 25 TABLET, FILM COATED ORAL at 21:00

## 2018-12-14 RX ADMIN — PRIMIDONE SCH MG: 50 TABLET ORAL at 17:44

## 2018-12-14 RX ADMIN — SODIUM CHLORIDE SCH MLS/HR: 9 INJECTION, SOLUTION INTRAVENOUS at 19:25

## 2018-12-14 RX ADMIN — METOPROLOL SUCCINATE SCH MG: 25 TABLET, EXTENDED RELEASE ORAL at 12:49

## 2018-12-14 RX ADMIN — TAZOBACTAM SODIUM AND PIPERACILLIN SODIUM SCH MLS/HR: 375; 3 INJECTION, SOLUTION INTRAVENOUS at 17:44

## 2018-12-14 RX ADMIN — INSULIN ASPART SCH UNITS: 100 INJECTION, SUSPENSION SUBCUTANEOUS at 07:30

## 2018-12-14 RX ADMIN — ASPIRIN 81 MG CHEWABLE TABLET SCH MG: 81 TABLET CHEWABLE at 12:49

## 2018-12-14 RX ADMIN — Medication SCH MG: at 21:00

## 2018-12-14 RX ADMIN — INSULIN LISPRO SCH UNIT: 100 INJECTION, SOLUTION INTRAVENOUS; SUBCUTANEOUS at 16:30

## 2018-12-14 NOTE — NUR
PATIENT OFF THE UNIT TO CATH LAB FOR PROCEDURE. PATIENT IS IN STABLE CONDITION WITH NO S/S 
OF RESPIRATORY DISTRESS. NO PAIN VOICED. DRESSING TO RIGHT GREAT TOE/FOOT IS INTACT.

## 2018-12-14 NOTE — PROGRESS NOTE
DATE:  December 14, 2018 



SUBJECTIVE:  Patient seen at bedside.  Will be having an angiogram with 

possible angioplasty on this date with Dr. Mcmahon.  Denies any history of 

fever, chills, nausea, or vomiting.



OBJECTIVE   

VITAL SIGNS:  Afebrile, pulse rate 72, respirations 18, blood pressure 

109/56, O2 saturation 96%.  

EXTREMITIES:  Gangrenous changes to the right great toe are getting 

somewhat better.  Has an ulceration to the distal aspect of the right great 

toe down to very close to bone with a foul smell subungually.  The nail is 

yellow, discolored and filled with debris.  Pedal pulses are diminished.  

Decreased cellulitis of the dorsal aspect of the right foot. 



ASSESSMENT

1.  Peripheral artery disease.

2.  Diabetic neuropathy.  

3.  Grade 3 ulceration/4 ulceration with possible osteomyelitis with 

onychomycosis.



PLAN:  Secondary to his neuropathy, sharp excisional debridement of the 

ulcer was carried very close to bone.  Devitalized tissue removed until 

good viable bleeding tissue was achieved.  Nail plate was removed to air 

out the nail bed.  Sterile dressing was applied, including diluted 

wet-to-dry Betadine.  Cultures were taken for aerobic and anaerobic growth. 

 The patient will be 

undergoing an angiogram with possible angioplasty.  Will continue treating 

the patient conservatively to see how the patient responds to try to 

salvage toe and foot.  









DD:  12/14/2018 08:05

DT:  12/14/2018 08:28

Job#:  [id number]

## 2018-12-14 NOTE — NUR
PATIENT IS IN STABLE CONDITION WITH NO S/S OF RESPIRATORY DISTRESS. NO PAIN VOICED. IV 
ANTIBIOTIC INFUSING. RIGHT GREAT TOE DRESSING IS DRY AND INTACT. FAMILY MEMBERS PRESENT IN 
ROOM. CALL LIGHT IS WITHIN REACH, INSTRUCTED TO CALL FOR ASSISTANCE AS NEEDED. REPORT GIVEN 
TO ONCOMING NURSE.

## 2018-12-14 NOTE — NUR
PATIENT BACK ON THE UNIT FROM [facility] LAB- DRESSING APPLIED TO THE LEFT GROIN SITE, IT IS DRY 
AND INTACT. PEDAL PULSES CHECKED AND ARE PALPABLE. PATIENT INFORMED AND ACKNOWLEDGES 
UNDERSTANDING ON REMAINING FLAT UNTIL NOTIFIED. CALL LIGHT WITHIN PATIENT'S HANDS. BED ALARM 
APPLIED.

## 2018-12-14 NOTE — NUR
RECEIVED VANCO TROUGH ORDER FROM DR. STARR. ORDER TO HOLD VANCOMYCIN IF TROUGH IS GREATER 
THAN 15 AND OBTAIN A RANDOM VANCO LAB ORDER FOR THE NEXT MORNING.

## 2018-12-14 NOTE — PROGRESS NOTE
DATE:  December 14, 2018 



CARDIOLOGY PROGRESS NOTE



SUBJECTIVE:  No new complaints today.  



OBJECTIVE 

VITALS:  Temperature 97.3, heart rate 71, respiratory rate 16, blood 

pressure 137/67, O2 sat 98% on room air.

GENERAL:  In no acute distress.  Alert.

NECK:  No JVD.  

CHEST:  Clear to auscultation. 

CARDIOVASCULAR:  Regular rate and rhythm.  S1 and S2.  No S3.  No S4.

ABDOMEN:  Soft and nontender.

EXTREMITIES:  Right 1st toe dressed.



CARDIOVASCULAR MEDICATIONS:  Reviewed.  

1.  Metoprolol succinate 25 mg daily.

2.  Atorvastatin 40 mg at bedtime.

3.  Lovenox 40 mg subcutaneous daily.

4.  Aspirin 81 mg daily.

5.  Vancomycin and Zosyn.



STUDIES:  Reviewed.  Sodium 133 potassium 4.4, chloride 99, bicarbonate 23, 

BUN 22, creatinine 1.03, glucose 171.  White blood cells 8.2, hemoglobin 

13.1 and platelets 193,000.  AST 14, ALT 18.



ASSESSMENT 

1.  Peripheral arterial disease with critical limb ischemia, 1st toe wound.

2.  Hypertension.

3.  Dyslipidemia.

4.  Diabetes mellitus, type 2.



RECOMMENDATIONS:  Plan for angiography and possible endovascular 

intervention today.  Further recommendations to follow.  Continue rest of 

cardiovascular medications.  









DD:  12/14/2018 09:38

DT:  12/14/2018 09:52

Job#:  [id number]

## 2018-12-14 NOTE — NUR
RECEIVED CALL FROM LAB REGARDING VANCO TROUGH OF 12.5; ACCORDING TO DR. STARR'S ORDER OKAY TO 
ADMINISTER.

## 2018-12-14 NOTE — NUR
My findings and recommendations TODAY are based on the patient's symptoms, exam, diagnostic testing and records. REPORT RECEIVED FROM OFF GOING NURSE, PT SITTING UP IN BED EATING WITH HOB ELEVATED, PT AAOX 
3-4, NO DISTRESS NOTED, DENIES NEEDS, CALL LIGHT IN REACH, INSTRUCTED TO CALL WITH NEEDS, 
FAMILY AT BEDSIDE, IV INFUSING PER ORDER, DRESSING TO RIGHT TOE/ FOOT, C/D/I NO ACTIVE 
DRAINAGE NOTED

## 2018-12-15 VITALS — SYSTOLIC BLOOD PRESSURE: 117 MMHG | DIASTOLIC BLOOD PRESSURE: 62 MMHG

## 2018-12-15 VITALS — SYSTOLIC BLOOD PRESSURE: 148 MMHG | DIASTOLIC BLOOD PRESSURE: 70 MMHG

## 2018-12-15 VITALS — DIASTOLIC BLOOD PRESSURE: 80 MMHG | SYSTOLIC BLOOD PRESSURE: 159 MMHG

## 2018-12-15 VITALS — DIASTOLIC BLOOD PRESSURE: 70 MMHG | SYSTOLIC BLOOD PRESSURE: 151 MMHG

## 2018-12-15 VITALS — DIASTOLIC BLOOD PRESSURE: 58 MMHG | SYSTOLIC BLOOD PRESSURE: 122 MMHG

## 2018-12-15 LAB
ANION GAP SERPL CALC-SCNC: 12.7 MMOL/L (ref 8–16)
BASOPHILS # BLD AUTO: 0 10*3/UL (ref 0–0.1)
BASOPHILS NFR BLD AUTO: 0.3 % (ref 0–1)
BUN SERPL-MCNC: 14 MG/DL (ref 7–26)
BUN/CREAT SERPL: 15 (ref 6–25)
CALCIUM SERPL-MCNC: 9.2 MG/DL (ref 8.4–10.2)
CHLORIDE SERPL-SCNC: 104 MMOL/L (ref 98–107)
CO2 SERPL-SCNC: 28 MMOL/L (ref 22–29)
DEPRECATED NEUTROPHILS # BLD AUTO: 5.8 10*3/UL (ref 2.1–6.9)
EGFRCR SERPLBLD CKD-EPI 2021: > 60 ML/MIN (ref 60–?)
EOSINOPHIL # BLD AUTO: 0.2 10*3/UL (ref 0–0.4)
EOSINOPHIL NFR BLD AUTO: 2.2 % (ref 0–6)
ERYTHROCYTE [DISTWIDTH] IN CORD BLOOD: 12.1 % (ref 11.7–14.4)
GLUCOSE SERPLBLD-MCNC: 106 MG/DL (ref 74–118)
HCT VFR BLD AUTO: 39.9 % (ref 38.2–49.6)
HGB BLD-MCNC: 13.3 G/DL (ref 14–18)
LYMPHOCYTES # BLD: 0.6 10*3/UL (ref 1–3.2)
LYMPHOCYTES NFR BLD AUTO: 8.4 % (ref 18–39.1)
MCH RBC QN AUTO: 29.9 PG (ref 28–32)
MCHC RBC AUTO-ENTMCNC: 33.3 G/DL (ref 31–35)
MCV RBC AUTO: 89.7 FL (ref 81–99)
MONOCYTES # BLD AUTO: 0.7 10*3/UL (ref 0.2–0.8)
MONOCYTES NFR BLD AUTO: 9.7 % (ref 4.4–11.3)
NEUTS SEG NFR BLD AUTO: 79 % (ref 38.7–80)
PLATELET # BLD AUTO: 208 X10E3/UL (ref 140–360)
POTASSIUM SERPL-SCNC: 4.7 MMOL/L (ref 3.5–5.1)
RBC # BLD AUTO: 4.45 X10E6/UL (ref 4.3–5.7)
SODIUM SERPL-SCNC: 140 MMOL/L (ref 136–145)

## 2018-12-15 PROCEDURE — 0QBQ0ZZ EXCISION OF RIGHT TOE PHALANX, OPEN APPROACH: ICD-10-PCS | Performed by: PODIATRIST

## 2018-12-15 RX ADMIN — SODIUM CHLORIDE SCH MLS/HR: 9 INJECTION, SOLUTION INTRAVENOUS at 18:02

## 2018-12-15 RX ADMIN — INSULIN ASPART SCH UNITS: 100 INJECTION, SUSPENSION SUBCUTANEOUS at 21:00

## 2018-12-15 RX ADMIN — ENOXAPARIN SODIUM SCH MG: 40 INJECTION SUBCUTANEOUS at 17:21

## 2018-12-15 RX ADMIN — TAZOBACTAM SODIUM AND PIPERACILLIN SODIUM SCH MLS/HR: 375; 3 INJECTION, SOLUTION INTRAVENOUS at 02:00

## 2018-12-15 RX ADMIN — METOPROLOL SUCCINATE SCH MG: 25 TABLET, EXTENDED RELEASE ORAL at 09:36

## 2018-12-15 RX ADMIN — SODIUM CHLORIDE SCH MLS/HR: 9 INJECTION, SOLUTION INTRAVENOUS at 05:25

## 2018-12-15 RX ADMIN — INSULIN ASPART SCH UNITS: 100 INJECTION, SUSPENSION SUBCUTANEOUS at 09:48

## 2018-12-15 RX ADMIN — INSULIN LISPRO SCH UNIT: 100 INJECTION, SOLUTION INTRAVENOUS; SUBCUTANEOUS at 16:30

## 2018-12-15 RX ADMIN — TAZOBACTAM SODIUM AND PIPERACILLIN SODIUM SCH MLS/HR: 375; 3 INJECTION, SOLUTION INTRAVENOUS at 09:49

## 2018-12-15 RX ADMIN — VANCOMYCIN HYDROCHLORIDE SCH MLS/HR: 1 INJECTION, SOLUTION INTRAVENOUS at 18:02

## 2018-12-15 RX ADMIN — PRIMIDONE SCH MG: 50 TABLET ORAL at 09:35

## 2018-12-15 RX ADMIN — ASPIRIN 81 MG CHEWABLE TABLET SCH MG: 81 TABLET CHEWABLE at 09:35

## 2018-12-15 RX ADMIN — VANCOMYCIN HYDROCHLORIDE SCH MLS/HR: 1 INJECTION, SOLUTION INTRAVENOUS at 06:00

## 2018-12-15 RX ADMIN — Medication SCH MG: at 21:00

## 2018-12-15 RX ADMIN — INSULIN LISPRO SCH UNIT: 100 INJECTION, SOLUTION INTRAVENOUS; SUBCUTANEOUS at 21:00

## 2018-12-15 RX ADMIN — TAZOBACTAM SODIUM AND PIPERACILLIN SODIUM SCH MLS/HR: 375; 3 INJECTION, SOLUTION INTRAVENOUS at 17:21

## 2018-12-15 RX ADMIN — LOSARTAN POTASSIUM SCH MG: 25 TABLET, FILM COATED ORAL at 21:00

## 2018-12-15 RX ADMIN — CLOPIDOGREL BISULFATE SCH MG: 75 TABLET, FILM COATED ORAL at 09:35

## 2018-12-15 RX ADMIN — LEVOTHYROXINE SODIUM SCH MCG: 50 TABLET ORAL at 06:00

## 2018-12-15 RX ADMIN — INSULIN LISPRO SCH UNIT: 100 INJECTION, SOLUTION INTRAVENOUS; SUBCUTANEOUS at 11:30

## 2018-12-15 RX ADMIN — INSULIN LISPRO SCH UNIT: 100 INJECTION, SOLUTION INTRAVENOUS; SUBCUTANEOUS at 07:30

## 2018-12-15 RX ADMIN — PRIMIDONE SCH MG: 50 TABLET ORAL at 17:21

## 2018-12-15 RX ADMIN — Medication SCH MG: at 09:35

## 2018-12-15 NOTE — NUR
REPORT RECEIVED FROM OFF GOING NURSE, PT RESTING IN BED ALERT AND ORIENTED, IV INFUSING PER 
ORDER, NO DISTRESS NOTED, PT DENIES NEEDS, BED LOCKED AND LOW, DRESSING TO RIGHT TOE/FOOT, 
C/D/I, NO ACTIVE DRAINAGE NOTED, WIFE AT BEDSIDE, PT DENIES PAIN, CALL LIGHT IN REACH, 
INSTRUCTED TO CALL WITH NEEDS

## 2018-12-15 NOTE — PROGRESS NOTE
DATE:  December 15, 2018 



CARDIOLOGY PROGRESS NOTE



SUBJECTIVE:  No complaints.  His right foot feels warm today.  Patient had 

questions, which were discussed.



OBJECTIVE

VITALS:  Temperature 97.8, heart rate 75, blood pressure 117/62, 

respiratory rate 18, and O2 sat 95% on room air.  BMI 29.

GENERAL:  In no acute distress.  Alert.

NECK:  No JVD.

CHEST:  Clear to auscultation.

CARDIOVASCULAR:  Regular rate and rhythm.  Normal S1 and S2.  No S3 or S4.

ABDOMEN:  Soft and nontender.

EXTREMITIES:  No edema.  Warm distal extremities.  Right foot covered with 

dressing.  Digits are warm with good capillary refill.



STUDIES:  Sodium 140, potassium 4.7, chloride 104, bicarbonate 28, BUN 14, 

creatinine 0.92.  Hemoglobin 13.3, platelets 202, white blood cell 9.29.  

AST 14, ALT 18.



CARDIOVASCULAR MEDICATIONS:  Reviewed;

1. Metoprolol succinate 25 mg daily.

2. Levothyroxine 50 mg daily.

3. Atorvastatin 40 mg q.h.s.

4. Zosyn and vancomycin antibiotics.

5. Aspirin 81 mg daily.

6. Lovenox 40 mg daily.

7. Clopidogrel 75 mg daily.

8. Losartan 50 mg q.h.s.



ASSESSMENT

1. Peripheral arterial disease, status post right anterior tibial PTA 

and status post right peroneal CSI arthrectomy and PTA with excellent 

angiographic results.

2. Diabetes mellitus type 2.

3. Hypertension.

4. Dyslipidemia.



RECOMMENDATIONS

1. Continue current dual-antiplatelet therapy and rest of cardiovascular 

medications.

2. Continue antibiotics and wound care per podiatry.









DD:  12/15/2018 09:30

DT:  12/15/2018 10:10

Job#:  [id number] LILI GUERRA

## 2018-12-15 NOTE — NUR
PATIENT IS SITTING UP IN BED- IN STABLE CONDITION WITH NO S/S OF RESPIRATORY DISTRESS. IV 
ANTIBIOTIC INFUSING. WIFE IN ROOM. DRESSING TO RIGHT GREAT TOE/FOOT IS DRY AND INTACT. CALL 
LIGHT IS WITHIN REACH, INSTRUCTED TO CALL FOR ASSISTANCE AS NEEDED. REPORT GIVEN TO ONCOMING 
NURSE.

## 2018-12-15 NOTE — PROGRESS NOTE
DATE:  December 15, 2018 



SUBJECTIVE:  Patient seen at bedside accompanied by nurse.  Feels somewhat 

better.  Relates he has a warmer foot since he had his angioplasty done by 

Dr. Mcmahon.  denies any history of fever, chills, nausea, or vomiting.



OBJECTIVE

VITAL SIGNS:  Afebrile.  Pulse rate 71, respirations 18, blood pressure 

122/58, O2 saturation 94%.

EXTREMITIES:  Pedal pulses are somewhat palpable.  Skin temperature warm to 

touch.  Better CFT to all toes.  Has necrosis to the distal aspect of the 

right great toe with still some foul smell present.  There is bone felt 

through the ulceration site.



LABS:  Noted.  White blood cell count 7.2, hemoglobin 13.3 with a platelet 

count of 208.



ASSESSMENT:  Grade 4 ulcer with possible osteomyelitis with peripheral 

arterial disease, status post angioplasty with pregangrenous changes.



PLAN:  Sharp excisional debridement of the ulcer was carried down to bone.  

Devitalized tissue sharply excised.  The bone was scraped until good viable 

bleeding tissue was achieved.  We will continue apply the Santyl followed 

by diluted wet-to-dry.  Patient seems to be responding with serial 

debridement.  We will try salvage toe and foot if possible.  We will 

continue IV antibiotics, local wound care, and offloading.  Continue 

following.









DD:  12/15/2018 14:05

DT:  12/15/2018 14:20

Job#:  [id number] LILI

## 2018-12-15 NOTE — NUR
WOUND CARE PROVIDED TO PATIENT'S RIGHT GREAT TOE AT 1724 PER DR. OCAMPO'S ORDER. DRESSING DRY 
AND INTACT.

## 2018-12-15 NOTE — NUR
PATIENT IS AWAKE AND IN STABLE CONDITION WITH NO S/S OF RESPIRATORY DISTRESS. NO PAIN 
VOICED. DRESSING TO RIGHT GREAT TOE/FOOT IS DRY AND INTACT. IV FLUIDS INFUSING. WIFE PRESENT 
IN ROOM. CALL LIGHT IS WITHIN REACH, INSTRUCTED TO CALL FOR ASSISTANCE AS NEEDED.

## 2018-12-15 NOTE — NUR
PT RESTING IN BED ALERT AND ORIENTED, ON DISTRESS NOTED, DENIES NEEDS, WIFE AT BEDSIDE, CALL 
LIGHT IN REACH, INSTRUCTED TO CALL WITH NEEDS

## 2018-12-16 VITALS — DIASTOLIC BLOOD PRESSURE: 74 MMHG | SYSTOLIC BLOOD PRESSURE: 154 MMHG

## 2018-12-16 VITALS — SYSTOLIC BLOOD PRESSURE: 111 MMHG | DIASTOLIC BLOOD PRESSURE: 58 MMHG

## 2018-12-16 VITALS — SYSTOLIC BLOOD PRESSURE: 126 MMHG | DIASTOLIC BLOOD PRESSURE: 68 MMHG

## 2018-12-16 VITALS — SYSTOLIC BLOOD PRESSURE: 137 MMHG | DIASTOLIC BLOOD PRESSURE: 67 MMHG

## 2018-12-16 VITALS — SYSTOLIC BLOOD PRESSURE: 167 MMHG | DIASTOLIC BLOOD PRESSURE: 89 MMHG

## 2018-12-16 VITALS — DIASTOLIC BLOOD PRESSURE: 70 MMHG | SYSTOLIC BLOOD PRESSURE: 135 MMHG

## 2018-12-16 VITALS — SYSTOLIC BLOOD PRESSURE: 149 MMHG | DIASTOLIC BLOOD PRESSURE: 68 MMHG

## 2018-12-16 VITALS — DIASTOLIC BLOOD PRESSURE: 89 MMHG | SYSTOLIC BLOOD PRESSURE: 167 MMHG

## 2018-12-16 RX ADMIN — INSULIN ASPART SCH UNITS: 100 INJECTION, SUSPENSION SUBCUTANEOUS at 21:18

## 2018-12-16 RX ADMIN — Medication SCH MG: at 20:57

## 2018-12-16 RX ADMIN — INSULIN ASPART SCH UNITS: 100 INJECTION, SUSPENSION SUBCUTANEOUS at 07:30

## 2018-12-16 RX ADMIN — METOPROLOL SUCCINATE SCH MG: 25 TABLET, EXTENDED RELEASE ORAL at 08:43

## 2018-12-16 RX ADMIN — ASPIRIN 81 MG CHEWABLE TABLET SCH MG: 81 TABLET CHEWABLE at 08:43

## 2018-12-16 RX ADMIN — INSULIN LISPRO SCH UNIT: 100 INJECTION, SOLUTION INTRAVENOUS; SUBCUTANEOUS at 07:30

## 2018-12-16 RX ADMIN — INSULIN LISPRO SCH UNIT: 100 INJECTION, SOLUTION INTRAVENOUS; SUBCUTANEOUS at 16:30

## 2018-12-16 RX ADMIN — TAZOBACTAM SODIUM AND PIPERACILLIN SODIUM SCH MLS/HR: 375; 3 INJECTION, SOLUTION INTRAVENOUS at 09:02

## 2018-12-16 RX ADMIN — TAZOBACTAM SODIUM AND PIPERACILLIN SODIUM SCH MLS/HR: 375; 3 INJECTION, SOLUTION INTRAVENOUS at 17:06

## 2018-12-16 RX ADMIN — PRIMIDONE SCH MG: 50 TABLET ORAL at 08:43

## 2018-12-16 RX ADMIN — TAZOBACTAM SODIUM AND PIPERACILLIN SODIUM SCH MLS/HR: 375; 3 INJECTION, SOLUTION INTRAVENOUS at 02:00

## 2018-12-16 RX ADMIN — VANCOMYCIN HYDROCHLORIDE SCH MLS/HR: 1 INJECTION, SOLUTION INTRAVENOUS at 06:00

## 2018-12-16 RX ADMIN — SODIUM CHLORIDE SCH MLS/HR: 9 INJECTION, SOLUTION INTRAVENOUS at 17:06

## 2018-12-16 RX ADMIN — INSULIN LISPRO SCH UNIT: 100 INJECTION, SOLUTION INTRAVENOUS; SUBCUTANEOUS at 21:27

## 2018-12-16 RX ADMIN — CLOPIDOGREL BISULFATE SCH MG: 75 TABLET, FILM COATED ORAL at 08:43

## 2018-12-16 RX ADMIN — INSULIN LISPRO SCH UNIT: 100 INJECTION, SOLUTION INTRAVENOUS; SUBCUTANEOUS at 11:30

## 2018-12-16 RX ADMIN — SODIUM CHLORIDE SCH MLS/HR: 9 INJECTION, SOLUTION INTRAVENOUS at 01:25

## 2018-12-16 RX ADMIN — PRIMIDONE SCH MG: 50 TABLET ORAL at 17:06

## 2018-12-16 RX ADMIN — LOSARTAN POTASSIUM SCH MG: 25 TABLET, FILM COATED ORAL at 20:57

## 2018-12-16 RX ADMIN — Medication SCH MG: at 08:43

## 2018-12-16 RX ADMIN — ENOXAPARIN SODIUM SCH MG: 40 INJECTION SUBCUTANEOUS at 17:06

## 2018-12-16 RX ADMIN — LEVOTHYROXINE SODIUM SCH MCG: 50 TABLET ORAL at 06:00

## 2018-12-16 NOTE — PROGRESS NOTE
DATE:  December 16, 2018 



SUBJECTIVE:  Patient seen at bedside.  Doing better.  Accompanied by 

spouse.  Denies any history of fever, chills, nausea, or vomiting.



OBJECTIVE

VITAL SIGNS:  Afebrile, pulse rate 68, respirations 18, blood pressure 

154/74, O2 saturation 98%.

EXTREMITIES:  Ulceration to the right great toe continues to improve.  

Granular fibrotic base noted.  Bone felt to the distal aspect.  Skin color 

has started to become a little bit more symmetrical with adjacent toe and 

proximal aspect of the foot.  The amount of cellulitis to the dorsal aspect 

of right lower extremity continues to get better.  Patient is starting to 

have some feeling on his right great toe.  Pedal pulses are diminished, but 

skin temperature warm to touch.



LABS:  Show a white blood cell count of 7.2.



ASSESSMENT:  Peripheral arterial disease with a grade 3 ulcer, right foot, 

possible osteo, with cellulitis.



PLAN:  We will continue local wound care with Santyl followed by diluted 

wet-to-dry.  Continue Zosyn IV piggyback.  Continue offloading.  We will 

continue to follow.









DD:  12/16/2018 16:15

DT:  12/16/2018 16:50

Job#:  [id number]

## 2018-12-16 NOTE — NUR
WOUND CARE PROVIDED TO PATIENT'S RIGHT GREAT TOE. DRESSING IS DRY AND INTACT. WIFE PRESENT 
IN ROOM. CALL LIGHT IS WITHIN REACH, INSTRUCTED TO CALL FOR ASSISTANCE AS NEEDED.

## 2018-12-16 NOTE — NUR
PT RESTING IN BED ALERT AND ORIENTED, NO DISTRESS NOTED, DENIES NEEDS, WIFE AT BEDSIDE, IV 
INFUSING PER ORDER, DENIES PAIN, CALL LIGHT IN REACH, INSTRUCTED TO CALL WITH NEEDS

## 2018-12-16 NOTE — NUR
PATIENT IS IN STABLE CONDITION WITH NO S/S OF RESPIRATORY DISTRESS. PATIENT DENIES ANY 
PAIN.IV FLUIDS INFUSING. DRESSING TO RIGHT FOOT IS DRY AND INTACT. CALL LIGHT IS WITHIN 
REACH, INSTRUCTED TO CALL FOR ASSISTANCE AS NEEDED.

## 2018-12-16 NOTE — NUR
PATIENT IS IN STABLE CONDITION WITH NO S/S OF RESPIRATORY DISTRESS. NO PAIN VOICED. IV 
FLUIDS INFUSING. DRESSING TO RIGHT TOE IS DRY AND INTACT. CALL LIGHT IS WITHIN REACH, 
INSTRUCTED TO CALL FOR ASSISTANCE AS NEEDED. REPORT GIVEN TO ONCOMING NURSE.

## 2018-12-16 NOTE — NUR
RECEIVED PT IN BED AOX4 .RESPIRATIONS ARE EVEN AND UNLABORED .CELLULITIS AT THE RT FOOT WITH 
DRESSING .RT AC 20 G S/L DEBRIDEMENT TO THE RT FOOT AM .DENIES PAIN CALL LIGHT WITH IN REACH 
.CONTINUE TO MONITOR

## 2018-12-16 NOTE — PROGRESS NOTE
DATE:  December 16, 2018 



CARDIOLOGY PROGRESS NOTE



SUBJECTIVE:  No new complaints today.  Foot wound healing.



OBJECTIVE

VITALS:  Temperature is 96.1, heart rate 68, blood pressure 154/74, 

respiratory rate 18, and O2 sat 98%.

GENERAL:  No acute distress.  Alert.

NECK:  No JVD.

CHEST:  Clear to auscultation.

CARDIOVASCULAR:  Regular rate and rhythm.  Normal S1 and S2.  No S3, no S4.

ABDOMEN:  Soft.

EXTREMITIES:  Trace edema.  Right foot covered with dressing.



CARDIOVASCULAR MEDICATIONS:  Reviewed.

1.  Aspirin 81 mg daily.

2.  Clopidogrel 75 mg daily.

3.  Atorvastatin 40 mg nightly.

4.  Zosyn and vancomycin antibiotics.

5.  Losartan 50 mg daily.

6.  Lovenox 20 mg subcutaneous daily.

7.  Metoprolol succinate 25 mg daily.



STUDIES:  Reviewed.  Potassium 4.7, creatinine 0.9.  Hemoglobin 13.3, 

platelets 208,000, white blood cells 7.2.  Normal transaminases.



ASSESSMENT

1.  Peripheral arterial disease, status post right anterior tibial 

percutaneous transluminal angioplasty and right peroneal percutaneous 

transluminal angioplasty and atherectomy.

2.  Diabetes mellitus.

3.  Hypertension.

4.  Dyslipidemia.

5.  First toe wound.



RECOMMENDATIONS

1.  Continue podiatry care.

2.  Continue antibiotics.

3.  Current dual-antiplatelet therapy.

4.  Continue rest of cardiovascular medications and monitor blood pressure. 

 If it continues to remain elevated, we will up-titrate medication further.









DD:  12/16/2018 18:51

DT:  12/16/2018 22:02

Job#:  [id number]

## 2018-12-16 NOTE — NUR
RECEIVED A CALL FROM LAB REGARDING CRITICAL VANCO TROUGH OF 25.2- ORDER FROM DR. STARR TO 
HOLD VANCOMYCIN IF VANCO TROUGH IS GREATER THAN 15 AND OBTAIN RANDOM VANCO LAB THE NEXT DAY 
(012/17/18).

## 2018-12-17 VITALS — DIASTOLIC BLOOD PRESSURE: 70 MMHG | SYSTOLIC BLOOD PRESSURE: 137 MMHG

## 2018-12-17 VITALS — SYSTOLIC BLOOD PRESSURE: 163 MMHG | DIASTOLIC BLOOD PRESSURE: 83 MMHG

## 2018-12-17 VITALS — DIASTOLIC BLOOD PRESSURE: 88 MMHG | SYSTOLIC BLOOD PRESSURE: 193 MMHG

## 2018-12-17 VITALS — DIASTOLIC BLOOD PRESSURE: 62 MMHG | SYSTOLIC BLOOD PRESSURE: 130 MMHG

## 2018-12-17 LAB
ANION GAP SERPL CALC-SCNC: 13.3 MMOL/L (ref 8–16)
BASOPHILS # BLD AUTO: 0.1 10*3/UL (ref 0–0.1)
BASOPHILS NFR BLD AUTO: 0.7 % (ref 0–1)
BUN SERPL-MCNC: 12 MG/DL (ref 7–26)
BUN/CREAT SERPL: 12 (ref 6–25)
CALCIUM SERPL-MCNC: 9.1 MG/DL (ref 8.4–10.2)
CHLORIDE SERPL-SCNC: 102 MMOL/L (ref 98–107)
CO2 SERPL-SCNC: 27 MMOL/L (ref 22–29)
DEPRECATED NEUTROPHILS # BLD AUTO: 5 10*3/UL (ref 2.1–6.9)
EGFRCR SERPLBLD CKD-EPI 2021: > 60 ML/MIN (ref 60–?)
EOSINOPHIL # BLD AUTO: 0.3 10*3/UL (ref 0–0.4)
EOSINOPHIL NFR BLD AUTO: 3.5 % (ref 0–6)
ERYTHROCYTE [DISTWIDTH] IN CORD BLOOD: 12.3 % (ref 11.7–14.4)
GLUCOSE SERPLBLD-MCNC: 214 MG/DL (ref 74–118)
HCT VFR BLD AUTO: 40.6 % (ref 38.2–49.6)
HGB BLD-MCNC: 13.6 G/DL (ref 14–18)
LYMPHOCYTES # BLD: 1 10*3/UL (ref 1–3.2)
LYMPHOCYTES NFR BLD AUTO: 13.3 % (ref 18–39.1)
MCH RBC QN AUTO: 30.3 PG (ref 28–32)
MCHC RBC AUTO-ENTMCNC: 33.5 G/DL (ref 31–35)
MCV RBC AUTO: 90.4 FL (ref 81–99)
MONOCYTES # BLD AUTO: 0.9 10*3/UL (ref 0.2–0.8)
MONOCYTES NFR BLD AUTO: 12 % (ref 4.4–11.3)
NEUTS SEG NFR BLD AUTO: 69.9 % (ref 38.7–80)
PLATELET # BLD AUTO: 217 X10E3/UL (ref 140–360)
POTASSIUM SERPL-SCNC: 4.3 MMOL/L (ref 3.5–5.1)
RBC # BLD AUTO: 4.49 X10E6/UL (ref 4.3–5.7)
SODIUM SERPL-SCNC: 138 MMOL/L (ref 136–145)

## 2018-12-17 RX ADMIN — SODIUM CHLORIDE SCH MLS/HR: 9 INJECTION, SOLUTION INTRAVENOUS at 08:42

## 2018-12-17 RX ADMIN — LEVOTHYROXINE SODIUM SCH MCG: 50 TABLET ORAL at 06:25

## 2018-12-17 RX ADMIN — CLOPIDOGREL BISULFATE SCH MG: 75 TABLET, FILM COATED ORAL at 08:41

## 2018-12-17 RX ADMIN — ASPIRIN 81 MG CHEWABLE TABLET SCH MG: 81 TABLET CHEWABLE at 08:41

## 2018-12-17 RX ADMIN — TAZOBACTAM SODIUM AND PIPERACILLIN SODIUM SCH MLS/HR: 375; 3 INJECTION, SOLUTION INTRAVENOUS at 08:41

## 2018-12-17 RX ADMIN — Medication SCH MG: at 21:09

## 2018-12-17 RX ADMIN — PRIMIDONE SCH MG: 50 TABLET ORAL at 16:04

## 2018-12-17 RX ADMIN — PRIMIDONE SCH MG: 50 TABLET ORAL at 08:41

## 2018-12-17 RX ADMIN — TAZOBACTAM SODIUM AND PIPERACILLIN SODIUM SCH MLS/HR: 375; 3 INJECTION, SOLUTION INTRAVENOUS at 16:51

## 2018-12-17 RX ADMIN — METOPROLOL SUCCINATE SCH MG: 25 TABLET, EXTENDED RELEASE ORAL at 08:41

## 2018-12-17 RX ADMIN — INSULIN LISPRO SCH UNIT: 100 INJECTION, SOLUTION INTRAVENOUS; SUBCUTANEOUS at 12:36

## 2018-12-17 RX ADMIN — INSULIN ASPART SCH UNITS: 100 INJECTION, SUSPENSION SUBCUTANEOUS at 10:05

## 2018-12-17 RX ADMIN — LOSARTAN POTASSIUM SCH MG: 25 TABLET, FILM COATED ORAL at 21:09

## 2018-12-17 RX ADMIN — INSULIN ASPART SCH UNITS: 100 INJECTION, SUSPENSION SUBCUTANEOUS at 21:11

## 2018-12-17 RX ADMIN — INSULIN LISPRO SCH UNIT: 100 INJECTION, SOLUTION INTRAVENOUS; SUBCUTANEOUS at 21:18

## 2018-12-17 RX ADMIN — INSULIN LISPRO SCH UNIT: 100 INJECTION, SOLUTION INTRAVENOUS; SUBCUTANEOUS at 10:06

## 2018-12-17 RX ADMIN — INSULIN LISPRO SCH UNIT: 100 INJECTION, SOLUTION INTRAVENOUS; SUBCUTANEOUS at 18:31

## 2018-12-17 RX ADMIN — TAZOBACTAM SODIUM AND PIPERACILLIN SODIUM SCH MLS/HR: 375; 3 INJECTION, SOLUTION INTRAVENOUS at 02:00

## 2018-12-17 RX ADMIN — SODIUM CHLORIDE SCH MLS/HR: 9 INJECTION, SOLUTION INTRAVENOUS at 05:37

## 2018-12-17 NOTE — NUR
Received phone call from Dr. Jesús Camp and MD confirmed he will perform another debridement 
on right great toe tomorrow (12/18/18).

## 2018-12-17 NOTE — NUR
Patient visited in room during nursing rounds. Patient alert and oriented x3. Right big toe 
with gauze and kerlix dressing s/p debridement on 12/14/18. Dressing appear clean and dry. 
Wife at bedside. Patient aware Dr. Jesús Camp will perform another debridement on right 
great toe ulcer tomorrow morning. Will verify order from Dr. Conrado morrow.

## 2018-12-17 NOTE — NUR
ASSESSMENT: Spiritual concern

Pt relieved by information about illness. Pt states he is "happy" that his health is 
improving without serious surgery.



Intervention:  Provided empathic listening and facilitated illness update. 



Outcome: Pt expressed appreciation for visit. No need to follow at this time. 





YULIA RAO



Spiritual Care Department

O: [phone]

Pager: [phone] ([phone])

## 2018-12-17 NOTE — NUR
Pt received resting in bed with wife at bedside. Alert and oriented x4 with saline lock #20 
patent in right Ac receiving fluids as ordered. Dressing change done to right foot great 
toe. Oriented to staff and surroundings. Emotional support given. Fall precautions 
maintained. Call bell within reach. All meds given as ordered. Will monitor

## 2018-12-17 NOTE — PROGRESS NOTE
DATE:  December 17, 2018 



SUBJECTIVE:  Patient seen at bedside.  Doing well.  No apparent distress.  

Denies any history of fever, chills, nausea, or vomiting.



OBJECTIVE   

VITALS:  Afebrile, pulse rate 79, respirations 22, blood pressure 163/83, 

O2 saturation 95%. 

EXTREMITIES:  Ulceration to the right great toe continues to heal.  Has a 

grade 3/4 ulceration with some bone felt to the ulceration.  Some necrotic 

tissue noted, but negative foul smell.  Pedal pulses are diminished.  Skin 

temperature warm to touch.



LABS:  Noted.  



ASSESSMENT:  Grade 4 ulcer with osteomyelitis with cellulitis.



PLAN:  Continue IV antibiotics.  Continue local wound care.  Ulceration 

will be debrided possibly tomorrow at bedside.  Continue offloading.  

Continue local wound care and IV antibiotics.  Patient seems to be 

responding with conservative care for now.











DD:  12/17/2018 09:08

DT:  12/17/2018 09:18

Job#:  [id number]

## 2018-12-17 NOTE — NUR
PT RESTED DURING THE NIGHT .PT IS NPO FORT HE PROCEDURE PT HAD BATH .FAMILY AT THE BEDSIDE 
.CALL LIGHT WITH IN REACH

## 2018-12-17 NOTE — PROGRESS NOTE
DATE:  December 17, 2018 



CARDIOLOGY PROGRESS NOTE



SUBJECTIVE:  Denies chest pain or shortness of breath.  Patient has no 

current complaints.



OBJECTIVE 

VITAL SIGNS:  Reviewed.  Mildly hypertensive. 

GENERAL:  In no acute distress.  Alert.

NECK:  No JVD.  

CHEST:  Clear to auscultation. 

CARDIOVASCULAR:  Regular rate and rhythm.  Normal S1 and S2.  No S3, S4, 

murmurs, or rubs.

ABDOMEN:  Soft and nontender.

EXTREMITIES:  No edema.  Warm distal extremities.  Good capillary refill.  

First right toe wound seems to be healing well.



CARDIOVASCULAR MEDICATIONS:  Reviewed. 



ASSESSMENT 

1.  Peripheral arterial disease:  Status post revascularization.

2.  Right 1st toe wound, improving.

3.  Diabetes mellitus.

4.  Hypertension.

5.  Dyslipidemia.



RECOMMENDATIONS 

1.  Continue current cardiovascular medications.

2.  Monitor blood pressure.  May need further up titration of 

antihypertensives over the course of the following days if continues to be 

elevated.



3.  Continue podiatry care and antibiotics. 











DD:  12/17/2018 10:16

DT:  12/17/2018 10:49

Job#:  [id number]

## 2018-12-18 VITALS — SYSTOLIC BLOOD PRESSURE: 131 MMHG | DIASTOLIC BLOOD PRESSURE: 66 MMHG

## 2018-12-18 VITALS — DIASTOLIC BLOOD PRESSURE: 77 MMHG | SYSTOLIC BLOOD PRESSURE: 144 MMHG

## 2018-12-18 VITALS — SYSTOLIC BLOOD PRESSURE: 137 MMHG | DIASTOLIC BLOOD PRESSURE: 72 MMHG

## 2018-12-18 VITALS — DIASTOLIC BLOOD PRESSURE: 76 MMHG | SYSTOLIC BLOOD PRESSURE: 154 MMHG

## 2018-12-18 VITALS — DIASTOLIC BLOOD PRESSURE: 60 MMHG | SYSTOLIC BLOOD PRESSURE: 129 MMHG

## 2018-12-18 VITALS — DIASTOLIC BLOOD PRESSURE: 68 MMHG | SYSTOLIC BLOOD PRESSURE: 134 MMHG

## 2018-12-18 PROCEDURE — 0QBQ0ZZ EXCISION OF RIGHT TOE PHALANX, OPEN APPROACH: ICD-10-PCS | Performed by: PODIATRIST

## 2018-12-18 RX ADMIN — Medication SCH MG: at 22:03

## 2018-12-18 RX ADMIN — LOSARTAN POTASSIUM SCH MG: 25 TABLET, FILM COATED ORAL at 22:03

## 2018-12-18 RX ADMIN — TAZOBACTAM SODIUM AND PIPERACILLIN SODIUM SCH MLS/HR: 375; 3 INJECTION, SOLUTION INTRAVENOUS at 17:00

## 2018-12-18 RX ADMIN — INSULIN ASPART SCH UNITS: 100 INJECTION, SUSPENSION SUBCUTANEOUS at 17:15

## 2018-12-18 RX ADMIN — CLOPIDOGREL BISULFATE SCH MG: 75 TABLET, FILM COATED ORAL at 09:09

## 2018-12-18 RX ADMIN — INSULIN ASPART SCH UNITS: 100 INJECTION, SUSPENSION SUBCUTANEOUS at 09:08

## 2018-12-18 RX ADMIN — TAZOBACTAM SODIUM AND PIPERACILLIN SODIUM SCH MLS/HR: 375; 3 INJECTION, SOLUTION INTRAVENOUS at 02:15

## 2018-12-18 RX ADMIN — PRIMIDONE SCH MG: 50 TABLET ORAL at 16:28

## 2018-12-18 RX ADMIN — METOPROLOL SUCCINATE SCH MG: 25 TABLET, EXTENDED RELEASE ORAL at 09:09

## 2018-12-18 RX ADMIN — INSULIN LISPRO SCH UNIT: 100 INJECTION, SOLUTION INTRAVENOUS; SUBCUTANEOUS at 07:30

## 2018-12-18 RX ADMIN — PRIMIDONE SCH MG: 50 TABLET ORAL at 09:09

## 2018-12-18 RX ADMIN — INSULIN LISPRO SCH UNIT: 100 INJECTION, SOLUTION INTRAVENOUS; SUBCUTANEOUS at 12:31

## 2018-12-18 RX ADMIN — INSULIN LISPRO SCH UNIT: 100 INJECTION, SOLUTION INTRAVENOUS; SUBCUTANEOUS at 22:05

## 2018-12-18 RX ADMIN — ASPIRIN 81 MG CHEWABLE TABLET SCH MG: 81 TABLET CHEWABLE at 09:08

## 2018-12-18 RX ADMIN — TAZOBACTAM SODIUM AND PIPERACILLIN SODIUM SCH MLS/HR: 375; 3 INJECTION, SOLUTION INTRAVENOUS at 09:09

## 2018-12-18 RX ADMIN — LEVOTHYROXINE SODIUM SCH MCG: 50 TABLET ORAL at 06:06

## 2018-12-18 RX ADMIN — INSULIN LISPRO SCH UNIT: 100 INJECTION, SOLUTION INTRAVENOUS; SUBCUTANEOUS at 16:32

## 2018-12-18 NOTE — PROGRESS NOTE
DATE:  December 18, 2018 



SUBJECTIVE:  Patient at bedside accompanied by spouse.  Doing well.  Denies 

any history of fever, chills, nausea, or vomiting.



OBJECTIVE   

VITALS:  Afebrile, pulse rate 65, respirations 18, blood pressure 131/66, 

O2 saturation 96%. 

EXTREMITIES:  Ulceration to the right great toe continues to improve.  

Decreased cellulitis to the dorsal aspect.  Ulcer down to bone.  Bone felt 

through the ulceration site distally.  Skin temperature warm to touch on 

this date.  No cyanosis noted.



LABS:  Noted.  Has a white blood cell count of 7.14, hemoglobin 13.6.  



ASSESSMENT:  Grade 4 ulcer with osteomyelitis with bone exposed.



PLAN:  Sharp excisional debridement of the ulcer was carried down to bone.  

Some bone was scraped until good viable bleeding tissue was achieved.  

Sterile dressings applied.  Will continue IV Zosyn and vancomycin.  

Continue local wound care with Santyl followed by diluted wet-to-dry 

Betadine.  Continue offloading.  Continue to follow.  











DD:  12/18/2018 08:47

DT:  12/18/2018 09:06

Job#:  [id number]

## 2018-12-18 NOTE — NUR
Pt received resting in bed with wife at bedside. Dr. Camp assisted with bedside debridement 
of right great toe. Pain medication offered but pt refused. Emotional support given. Call 
bell within reach. Will monitor

## 2018-12-18 NOTE — NUR
Patient visited in room during nursing rounds. Patient alert and oriented x3. Wife at 
bedside. S/P debridement on right big toe. Site covered with gauze and kerlix and appear 
clean and dry. Patient denies any pain but states he feels sleepy. Explained to patient 
sleepiness might be attributed to topical anesthesia used by MD during debridement 
procedure. Will monitor patient closely.

## 2018-12-18 NOTE — NUR
Pt resting comfortably in bed with wife at bedside. Plan for possible discharge tomorrow. 
Emotional support given. Will endorse to next shift

## 2018-12-18 NOTE — PROGRESS NOTE
DATE:  December 18, 2018 



CARDIOLOGY PROGRESS NOTE



SUBJECTIVE:  No new complaints. 



OBJECTIVE

VITAL SIGNS:  Temperature 97.5, heart rate 72, blood pressure 154/76, 

respiratory rate 22, O2 sat 99% room air.

GENERAL:  In no acute distress. Alert.

NECK:  No JVD.

CHEST:  Clear to auscultation.

CARDIOVASCULAR:  Regular rate and rhythm. Normal S1 and S2. No S3, no S4. 

No murmurs or rubs. 

ABDOMEN:  Soft, nontender, nondistended. 

EXTREMITIES:  No cyanosis, clubbing or edema. Right foot covered with 

dressings.



CARDIOVASCULAR MEDICATIONS:  Reviewed.

1. Atorvastatin 40 mg nightly.

2. Aspirin 81 mg daily.

3. Metoprolol succinate 25 mg daily.

4. Clopidogrel 75 mg daily.

5. Zosyn and vancomycin.



STUDIES:  Reviewed. Sodium 138, potassium 4.6, chloride 102, bicarbonate 

27, BUN 12, creatinine 1.02, glucose 214. White blood cells 9.14, 

hemoglobin 13.6, platelets 217. AST 14, ALT 18, alk phos 62.



ASSESSMENT

1. Peripheral arterial disease status post right lower extremity 

revascularization.

2. Hypertension.

3. Diabetes mellitus.

4. Dyslipidemia.

5. Neuropathy.

6. Hypothyroidism.



RECOMMENDATIONS

1. Up-titrate metoprolol to 50 mg daily.

2. Continue dual antiplatelet therapy. 

3. Continue atorvastatin and losartan.

4. Continue wound care and antibiotics.









DD:  12/18/2018 13:54

DT:  12/18/2018 14:12

Job#:  [id number]

## 2018-12-19 VITALS — SYSTOLIC BLOOD PRESSURE: 129 MMHG | DIASTOLIC BLOOD PRESSURE: 68 MMHG

## 2018-12-19 VITALS — DIASTOLIC BLOOD PRESSURE: 69 MMHG | SYSTOLIC BLOOD PRESSURE: 125 MMHG

## 2018-12-19 VITALS — DIASTOLIC BLOOD PRESSURE: 69 MMHG | SYSTOLIC BLOOD PRESSURE: 137 MMHG

## 2018-12-19 VITALS — SYSTOLIC BLOOD PRESSURE: 184 MMHG | DIASTOLIC BLOOD PRESSURE: 82 MMHG

## 2018-12-19 VITALS — SYSTOLIC BLOOD PRESSURE: 121 MMHG | DIASTOLIC BLOOD PRESSURE: 60 MMHG

## 2018-12-19 VITALS — SYSTOLIC BLOOD PRESSURE: 131 MMHG | DIASTOLIC BLOOD PRESSURE: 78 MMHG

## 2018-12-19 VITALS — DIASTOLIC BLOOD PRESSURE: 60 MMHG | SYSTOLIC BLOOD PRESSURE: 121 MMHG

## 2018-12-19 VITALS — SYSTOLIC BLOOD PRESSURE: 144 MMHG | DIASTOLIC BLOOD PRESSURE: 74 MMHG

## 2018-12-19 RX ADMIN — INSULIN LISPRO SCH UNIT: 100 INJECTION, SOLUTION INTRAVENOUS; SUBCUTANEOUS at 11:30

## 2018-12-19 RX ADMIN — Medication SCH MLS/HR: at 12:29

## 2018-12-19 RX ADMIN — CEFEPIME HYDROCHLORIDE SCH MLS/HR: 1 INJECTION, POWDER, FOR SOLUTION INTRAMUSCULAR; INTRAVENOUS at 15:00

## 2018-12-19 RX ADMIN — PRIMIDONE SCH MG: 50 TABLET ORAL at 17:17

## 2018-12-19 RX ADMIN — INSULIN ASPART SCH UNITS: 100 INJECTION, SUSPENSION SUBCUTANEOUS at 17:20

## 2018-12-19 RX ADMIN — LEVOTHYROXINE SODIUM SCH MCG: 50 TABLET ORAL at 06:20

## 2018-12-19 RX ADMIN — INSULIN LISPRO SCH UNIT: 100 INJECTION, SOLUTION INTRAVENOUS; SUBCUTANEOUS at 16:30

## 2018-12-19 RX ADMIN — INSULIN ASPART SCH UNITS: 100 INJECTION, SUSPENSION SUBCUTANEOUS at 07:30

## 2018-12-19 RX ADMIN — LOSARTAN POTASSIUM SCH MG: 25 TABLET, FILM COATED ORAL at 20:54

## 2018-12-19 RX ADMIN — Medication SCH MG: at 20:54

## 2018-12-19 RX ADMIN — CEFEPIME HYDROCHLORIDE SCH MLS/HR: 1 INJECTION, POWDER, FOR SOLUTION INTRAMUSCULAR; INTRAVENOUS at 09:30

## 2018-12-19 RX ADMIN — CEFEPIME HYDROCHLORIDE SCH MLS/HR: 1 INJECTION, POWDER, FOR SOLUTION INTRAMUSCULAR; INTRAVENOUS at 21:56

## 2018-12-19 RX ADMIN — INSULIN LISPRO SCH UNIT: 100 INJECTION, SOLUTION INTRAVENOUS; SUBCUTANEOUS at 21:00

## 2018-12-19 RX ADMIN — Medication SCH MLS/HR: at 20:54

## 2018-12-19 RX ADMIN — METOPROLOL SUCCINATE SCH MG: 50 TABLET, EXTENDED RELEASE ORAL at 09:15

## 2018-12-19 RX ADMIN — ASPIRIN 81 MG CHEWABLE TABLET SCH MG: 81 TABLET CHEWABLE at 09:15

## 2018-12-19 RX ADMIN — INSULIN LISPRO SCH UNIT: 100 INJECTION, SOLUTION INTRAVENOUS; SUBCUTANEOUS at 07:30

## 2018-12-19 RX ADMIN — TAZOBACTAM SODIUM AND PIPERACILLIN SODIUM SCH MLS/HR: 375; 3 INJECTION, SOLUTION INTRAVENOUS at 02:00

## 2018-12-19 RX ADMIN — CLOPIDOGREL BISULFATE SCH MG: 75 TABLET, FILM COATED ORAL at 09:15

## 2018-12-19 RX ADMIN — PRIMIDONE SCH MG: 50 TABLET ORAL at 09:15

## 2018-12-19 NOTE — NUR
Nutrition Screen Note



RD Recommendation for Physician: 

-     Continue ADA diet as ordered

-     Pt and family are not interested in diet education  12/12

     

Plan of Care: RD following, monitoring for tolerance and adequacy



Nutrition reason for involvement:

Follow up 



Primary Diagnose(s):

1.     Peripheral arterial disease with cellulitis to the dorsal aspect of the right foot.  

2.     Pregangrenous changes noted.  

3.     Diabetic neuropathy with mild hallux valgus deformity.



PMH: Diabetes, on insulin therapy, hypertension, hypothyroidism, diabetic neuropathy.



Ht: 69in 

Wt: 190lb  12/11 admit weight; 188lb  12/19     

BMI: 28.1kg/m2

IBW: 160lb     



RD Assessment:

(12/19) Chart reviewed. Pt had I&D of his R foot today. Visited pt in the room. Pt is awake, 
alert and in no acute distress. Pt reports eating very well with ~% recorded PO 
intake. No GI complains noted. LBM  12/18. Will continue to monitor and follow. 



(12/12) Chart reviewed. Labs and meds reviewed. 74 yo M, who is admitted for R foot 
cellulitis and infection. During my visit, pt reports good appetite and denies eating less 
than usual PTA. No GI complains noted. LBM  12/12. Pt denies any chewing or swallowing 
difficulty. No recent weight loss reported. Pt has had hx of DM for over 30years and has 
been f/u with his endocrinologist for DM management. Pt and family are not interested in any 
further education. Will continue to monitor and follow. 



Current Diet: ADA diet 



Malnutrition Evaluation (12/12/2018)

The patient does not meet criteria for a specified degree of malnutrition at this time. Will 
re-evaluate at follow-up as appropriate. 



Diet Education Needs Assessment:

Diet education indicated, pt and family are not interested. 



 

Nutrition Care Level: low 





Signed: Tiffanie Valverde, MS, RD, LD

## 2018-12-19 NOTE — PROGRESS NOTE
DATE:  December 19, 2018 



SUBJECTIVE:  Patient seen at bedside, accompanied by nurse with Dr. Biswas 

being present.  Doing well.  Denies any history of fever, chills, nausea, 

or vomiting.



OBJECTIVE:

VITAL SIGNS:  Afebrile.  Pulse rate 70, respiration 18, blood pressure 

121/60, O2 saturation 96%.

EXTREMITIES:  Ulceration to distal aspect of the right great toe continues 

to improve.  Granulation tissue noted.  Bone felt through the ulceration 

site.  Ulcer is approximately 1.5 to 2 cm in diameter.  Decreased 

cellulitis and decreased edema.  Pedal pulses are diminished.



LABS:  Showed a white blood cell count of 7.14.



ASSESSMENT:  Peripheral arterial disease with grade 4 ulcer, osteomyelitis, 

responding to local wound care, intravenous antibiotics, and serial 

debridements.



PLAN:  Will continue Santyl, followed by dilute wet-to-dry Betadine.  

Continue IV antibiotics.  Continue offloading. Patient continues 

responding.  Possible discharge date will be Sunday depending on 

progression of treatment.







DD:  12/19/2018 08:51

DT:  12/19/2018 09:11

Job#:  [id number]

## 2018-12-19 NOTE — NUR
MD IN TO SEE PATIENT, DRESSING REMOVED FROM RIGHT GREAT TOE, PATIENT BLEEDING, ORDER 
RECEIVED TO APPLIED PRESSURE DRESSING. DRESSING APPLIED AS ORDERED. PATIENT IN BED WITH CALL 
LIGHT AT REACH. FAMILY MEMBER AT BED SIDE. WILL CLOSELY MONITOR.

## 2018-12-19 NOTE — NUR
PATIENT IN BED TALKING TO FAMILY MEMBER VISITING, NO S/S OF DISCOMFORT NOTED. DRESSING 
INTACT TO RIGHT FOOT, NO BLEEDING NOTED. CALL LIGHT AT REACH.

## 2018-12-19 NOTE — NUR
PATIENT SITTING UP IN BED WATCHING TV. OLD AORTOGRAM SITE TO LEFT GROIN INTACT AND OPEN TO 
AIR. BED IN LOWER POSITION, CALL LIGHT AT REACH.

## 2018-12-19 NOTE — PROGRESS NOTE
DATE:  December 19, 2018 



CARDIOLOGY PROGRESS NOTE



SUBJECTIVE:  No new complaints. Denies any chest discomfort or shortness of 

breath.



OBJECTIVE

VITAL SIGNS:  Temperature 98.2, heart rate 68, blood pressure 137/69, 

respiratory rate 18, O2 sat 96% at room air..

GENERAL:  In no acute distress. Alert, active.

NECK:  No JVD.

CHEST:  Clear to auscultation.

CARDIOVASCULAR:  Regular rate and rhythm. Normal S1 and S2. No S3, no S4. 

ABDOMEN:  Soft, nontender.

EXTREMITIES:  No edema. Right foot covered with dressings.



CARDIOVASCULAR MEDICATIONS:  Reviewed.

1. Aspirin 81 mg daily.

2. Clopidogrel 75 mg daily.

3. Losartan 50 mg nightly.

4. Atorvastatin 40 mg nightly.

5. Metoprolol succinate 50 mg daily.

6. Cefepime and ampicillin antibiotics.



STUDIES:  Reviewed. Sodium 138, potassium 4.3, chloride 102, bicarbonate 

27, BUN 12, creatinine is 1.02, glucose 214. White blood cells 9.1, 

hemoglobin 13.6, platelets 217. AST 14, ALT 18, alk phos 62.



ASSESSMENT

1. Peripheral arterial disease status post right lower extremity 

arterial revascularization. 

2. Hypertension.

3. Dyslipidemia.

4. Diabetes mellitus.

5. Right 1st toe ulcer, improving.



RECOMMENDATIONS

1. Continue current antibiotic therapy and podiatry care. 

2. Continue current cardiovascular medications and monitor blood 

pressure response to recent up-titration of beta blocker. 









DD:  12/19/2018 13:14

DT:  12/19/2018 13:32

Job#:  [id number]

## 2018-12-20 VITALS — SYSTOLIC BLOOD PRESSURE: 129 MMHG | DIASTOLIC BLOOD PRESSURE: 65 MMHG

## 2018-12-20 VITALS — DIASTOLIC BLOOD PRESSURE: 70 MMHG | SYSTOLIC BLOOD PRESSURE: 133 MMHG

## 2018-12-20 VITALS — DIASTOLIC BLOOD PRESSURE: 65 MMHG | SYSTOLIC BLOOD PRESSURE: 137 MMHG

## 2018-12-20 VITALS — SYSTOLIC BLOOD PRESSURE: 133 MMHG | DIASTOLIC BLOOD PRESSURE: 70 MMHG

## 2018-12-20 VITALS — DIASTOLIC BLOOD PRESSURE: 65 MMHG | SYSTOLIC BLOOD PRESSURE: 139 MMHG

## 2018-12-20 VITALS — SYSTOLIC BLOOD PRESSURE: 114 MMHG | DIASTOLIC BLOOD PRESSURE: 58 MMHG

## 2018-12-20 VITALS — DIASTOLIC BLOOD PRESSURE: 66 MMHG | SYSTOLIC BLOOD PRESSURE: 115 MMHG

## 2018-12-20 VITALS — DIASTOLIC BLOOD PRESSURE: 68 MMHG | SYSTOLIC BLOOD PRESSURE: 132 MMHG

## 2018-12-20 RX ADMIN — Medication SCH MG: at 20:58

## 2018-12-20 RX ADMIN — CEFEPIME HYDROCHLORIDE SCH MLS/HR: 1 INJECTION, POWDER, FOR SOLUTION INTRAMUSCULAR; INTRAVENOUS at 14:42

## 2018-12-20 RX ADMIN — LEVOTHYROXINE SODIUM SCH MCG: 50 TABLET ORAL at 06:01

## 2018-12-20 RX ADMIN — INSULIN ASPART SCH UNITS: 100 INJECTION, SUSPENSION SUBCUTANEOUS at 07:30

## 2018-12-20 RX ADMIN — Medication SCH MLS/HR: at 12:25

## 2018-12-20 RX ADMIN — Medication SCH MLS/HR: at 04:05

## 2018-12-20 RX ADMIN — Medication SCH MLS/HR: at 20:58

## 2018-12-20 RX ADMIN — PRIMIDONE SCH MG: 50 TABLET ORAL at 09:14

## 2018-12-20 RX ADMIN — CEFEPIME HYDROCHLORIDE SCH MLS/HR: 1 INJECTION, POWDER, FOR SOLUTION INTRAMUSCULAR; INTRAVENOUS at 21:57

## 2018-12-20 RX ADMIN — INSULIN LISPRO SCH UNIT: 100 INJECTION, SOLUTION INTRAVENOUS; SUBCUTANEOUS at 16:30

## 2018-12-20 RX ADMIN — CEFEPIME HYDROCHLORIDE SCH MLS/HR: 1 INJECTION, POWDER, FOR SOLUTION INTRAMUSCULAR; INTRAVENOUS at 06:01

## 2018-12-20 RX ADMIN — ASPIRIN 81 MG CHEWABLE TABLET SCH MG: 81 TABLET CHEWABLE at 09:14

## 2018-12-20 RX ADMIN — COLLAGENASE SANTYL SCH GM: 250 OINTMENT TOPICAL at 10:00

## 2018-12-20 RX ADMIN — INSULIN LISPRO SCH UNIT: 100 INJECTION, SOLUTION INTRAVENOUS; SUBCUTANEOUS at 07:30

## 2018-12-20 RX ADMIN — METOPROLOL SUCCINATE SCH MG: 50 TABLET, EXTENDED RELEASE ORAL at 09:14

## 2018-12-20 RX ADMIN — CLOPIDOGREL BISULFATE SCH MG: 75 TABLET, FILM COATED ORAL at 09:14

## 2018-12-20 RX ADMIN — INSULIN LISPRO SCH UNIT: 100 INJECTION, SOLUTION INTRAVENOUS; SUBCUTANEOUS at 21:00

## 2018-12-20 RX ADMIN — INSULIN LISPRO SCH UNIT: 100 INJECTION, SOLUTION INTRAVENOUS; SUBCUTANEOUS at 11:30

## 2018-12-20 RX ADMIN — LOSARTAN POTASSIUM SCH MG: 25 TABLET, FILM COATED ORAL at 20:58

## 2018-12-20 RX ADMIN — PRIMIDONE SCH MG: 50 TABLET ORAL at 17:19

## 2018-12-20 RX ADMIN — INSULIN ASPART SCH UNITS: 100 INJECTION, SUSPENSION SUBCUTANEOUS at 17:00

## 2018-12-20 NOTE — NUR
Received pt in bed watching tv with spouse at bedside. No S/S of resp distress. Denies pain 
at this time. Noted dressing to R foot C,D,I.  Call light within reach and instructed to 
call for assistance. Pt and spouse verbalized understanding.

## 2018-12-20 NOTE — PROGRESS NOTE
DATE:  December 20, 2018 



CARDIOLOGY PROGRESS NOTE



SUBJECTIVE:  No new complaints today.



OBJECTIVE 

VITALS:  Temperature 97.7, heart rate 76, blood pressure 132/68, 

respiratory rate 18, O2 sat 96%.

GENERAL:  In no acute distress.  Alert and active.

NECK:  No JVD.  

CHEST:  Clear to auscultation.

CARDIOVASCULAR:  Regular rate and rhythm.  Normal S1 and S2.  No S3.  No 

S4.

ABDOMEN:  Soft and nontender.

EXTREMITIES:  Trace edema.  Right foot wound covered with dressing.



CARDIOVASCULAR MEDICATIONS:  Reviewed.  

1.  Aspirin 81 mg daily.

2.  Clopidogrel 75 mg daily.

3.  Cefepime and ampicillin antibiotics.  

4.  Atorvastatin 40 mg at bedtime. 

5.  Metoprolol succinate 50 mg daily.



STUDIES:  Reviewed.  Potassium 4.3, chloride 102, sodium 138, bicarbonate 

27, BUN 12, creatinine 1.02, glucose 214.  Hemoglobin 13.6 and platelets 

217,000.  White blood cells 7.1.  AST 14, ALT 18.



ASSESSMENT 

1.  Right foot cellulitis.

2.  Peripheral arterial disease:  Status post revascularization of the 

right lower extremity.

3.  Diabetes mellitus, type 2.

4.  Hypothyroidism. 

5.  Hypertension. 

6.  Dyslipidemia.



RECOMMENDATIONS

1.  Continue current cardiovascular medications and monitor blood pressure 

response to recent antihypertensive medication changes.  

2.  As outpatient, will follow closely.  Proceed with further 

cardiovascular evaluation.  The patient is high risk for cardiovascular 

outcomes until risk factors optimized.  This has been extensively 

discussed.  The patient is encouraged to take active thought in life until 

intervention.











DD:  12/20/2018 17:36

DT:  12/20/2018 17:44

Job#:  [id number]

## 2018-12-20 NOTE — PROGRESS NOTE
DATE:  December 20, 2018 



SUBJECTIVE:  Patient seen at bedside.  Doing better.  Seen accompanied by 

spouse.  Denies any history of fever, chills, nausea, or vomiting.



OBJECTIVE   

VITAL SIGNS:  Afebrile, pulse rate 65, respirations 18, blood pressure 

133/70, O2 saturation 95%. 

EXTREMITIES:  Ulceration to the distal aspect of the right great toe 

continues to improve.  Still some bone felt to the ulceration.  Granular 

fibrotic tissue.  Skin color is starting to become normal.  Decreased edema 

to the right foot.  Pedal pulses are diminished, but skin temperature is 

warm to touch.



LABS:  Noted.  Has a white blood cell count of 7.14, hemoglobin 13.6.  



ASSESSMENT:  Grade 4 ulcer with osteomyelitis, right great toe.  



PLAN:  Will continue IV antibiotics.  Continue local wound care.  Continue 

off loading.  Will continue to follow.  









DD:  12/20/2018 09:35

DT:  12/20/2018 09:57

Job#:  [id number]

## 2018-12-20 NOTE — NUR
URINAL   WITH 700CC OF YELLOW URINE, EMPTIED AND CLEANSED. PATIENT IN BED TALKING ON THE 
PHONE, CALL LIGHT AT REACH.

## 2018-12-20 NOTE — NUR
DRESSING CHANGED TO RIGHT GREAT TOE AS ORDERED, PATIENT TOLERATED PROCEDURE WELL. BED IN 
LOWER POSITION, CALL LIGHT AT REACH.

## 2018-12-20 NOTE — NUR
PATIENT IN BED RESTING WITH NO RESPIRATORY DISTRESS. DRESSING DRY AND INTACT TO RIGHT FOOT. 
BED IN LOWER POSITION, CALL LIGHT AT REACH.

## 2018-12-21 VITALS — DIASTOLIC BLOOD PRESSURE: 73 MMHG | SYSTOLIC BLOOD PRESSURE: 141 MMHG

## 2018-12-21 VITALS — DIASTOLIC BLOOD PRESSURE: 90 MMHG | SYSTOLIC BLOOD PRESSURE: 152 MMHG

## 2018-12-21 VITALS — DIASTOLIC BLOOD PRESSURE: 68 MMHG | SYSTOLIC BLOOD PRESSURE: 138 MMHG

## 2018-12-21 VITALS — SYSTOLIC BLOOD PRESSURE: 139 MMHG | DIASTOLIC BLOOD PRESSURE: 72 MMHG

## 2018-12-21 VITALS — SYSTOLIC BLOOD PRESSURE: 125 MMHG | DIASTOLIC BLOOD PRESSURE: 67 MMHG

## 2018-12-21 RX ADMIN — ASPIRIN 81 MG CHEWABLE TABLET SCH MG: 81 TABLET CHEWABLE at 09:00

## 2018-12-21 RX ADMIN — Medication SCH MG: at 22:14

## 2018-12-21 RX ADMIN — CEFEPIME HYDROCHLORIDE SCH MLS/HR: 1 INJECTION, POWDER, FOR SOLUTION INTRAMUSCULAR; INTRAVENOUS at 05:29

## 2018-12-21 RX ADMIN — CLOPIDOGREL BISULFATE SCH MG: 75 TABLET, FILM COATED ORAL at 09:00

## 2018-12-21 RX ADMIN — CEFEPIME HYDROCHLORIDE SCH MLS/HR: 1 INJECTION, POWDER, FOR SOLUTION INTRAMUSCULAR; INTRAVENOUS at 22:14

## 2018-12-21 RX ADMIN — METOPROLOL SUCCINATE SCH MG: 50 TABLET, EXTENDED RELEASE ORAL at 09:00

## 2018-12-21 RX ADMIN — PRIMIDONE SCH MG: 50 TABLET ORAL at 17:00

## 2018-12-21 RX ADMIN — PRIMIDONE SCH MG: 50 TABLET ORAL at 09:00

## 2018-12-21 RX ADMIN — INSULIN LISPRO SCH UNIT: 100 INJECTION, SOLUTION INTRAVENOUS; SUBCUTANEOUS at 11:30

## 2018-12-21 RX ADMIN — INSULIN LISPRO SCH UNIT: 100 INJECTION, SOLUTION INTRAVENOUS; SUBCUTANEOUS at 07:45

## 2018-12-21 RX ADMIN — Medication SCH MLS/HR: at 20:00

## 2018-12-21 RX ADMIN — COLLAGENASE SANTYL SCH GM: 250 OINTMENT TOPICAL at 09:00

## 2018-12-21 RX ADMIN — INSULIN LISPRO SCH UNIT: 100 INJECTION, SOLUTION INTRAVENOUS; SUBCUTANEOUS at 21:00

## 2018-12-21 RX ADMIN — LEVOTHYROXINE SODIUM SCH MCG: 50 TABLET ORAL at 06:08

## 2018-12-21 RX ADMIN — INSULIN ASPART SCH UNITS: 100 INJECTION, SUSPENSION SUBCUTANEOUS at 17:00

## 2018-12-21 RX ADMIN — CEFEPIME HYDROCHLORIDE SCH MLS/HR: 1 INJECTION, POWDER, FOR SOLUTION INTRAMUSCULAR; INTRAVENOUS at 14:00

## 2018-12-21 RX ADMIN — Medication SCH MLS/HR: at 04:20

## 2018-12-21 RX ADMIN — Medication SCH MLS/HR: at 12:02

## 2018-12-21 RX ADMIN — INSULIN ASPART SCH UNITS: 100 INJECTION, SUSPENSION SUBCUTANEOUS at 07:45

## 2018-12-21 RX ADMIN — INSULIN LISPRO SCH UNIT: 100 INJECTION, SOLUTION INTRAVENOUS; SUBCUTANEOUS at 16:30

## 2018-12-21 RX ADMIN — LOSARTAN POTASSIUM SCH MG: 25 TABLET, FILM COATED ORAL at 22:14

## 2018-12-21 NOTE — PROGRESS NOTE
DATE:  December 21, 2018 



SUBJECTIVE:  Patient at bedside accompanied by spouse.  Doing well.  Denies 

any history of fever, chills, nausea, or vomiting.



OBJECTIVE   

VITALS:  Afebrile.  Vital signs stable.  

EXTREMITIES:  Ulceration to the right great toe showing some granulation 

tissue noted.  Still tracking down to bone.  Bone felt to the ulceration. 



ASSESSMENT:  Grade 4 ulcer, healing with granulation tissue with possible 

osteomyelitis.



PLAN:  Will continue IV antibiotics for a couple more days.  Continue local 

care.  If the patient continues to improve, may be discharged possibly on 

Sunday.  











DD:  12/21/2018 06:58

DT:  12/21/2018 07:20

Job#:  [id number]

## 2018-12-21 NOTE — PROGRESS NOTE
DATE:  December 21, 2018 



CARDIOLOGY PROGRESS NOTE



SUBJECTIVE:  No new complaints.



OBJECTIVE

VITALS:  Temperature 97.8, heart rate 74, blood pressure 139/72, 

respiratory rate 18, O2 sat 94% on room air.  BMI is 28.6.

GENERAL:  In no acute distress.  Alert.

NECK:  No JVD.

CHEST:  Clear to auscultation.

CARDIOVASCULAR:  Regular rate and rhythm.  Normal S1 and S2.  No S3 or S4.

ABDOMEN:  Soft and nontender.

EXTREMITIES:  Trace edema.  Right foot wound covered with dressing.



CARDIOVASCULAR MEDICATIONS:  Have been reviewed.

1. Metoprolol succinate 50 mg daily.

2. Aspirin 81 mg daily.

3. Clopidogrel 75 mg daily.

4. Atorvastatin 40 mg at bedtime.

5. Ampicillin and cefepime antibiotics.

6. Losartan 50 mg daily.



STUDIES:  Reviewed.  Sodium 138, potassium 4.3, chloride 102, bicarbonate 

27, BUN 12, creatinine 1.02, glucose 214.  White blood cell 7.1, hemoglobin 

13.6, platelets 217.  AST 14, ALT 18.



ASSESSMENT

1. Peripheral arterial disease, status post right lower extremity 

revascularization for left first toe wound, now improving.

2. Diabetes mellitus.

3. Hypertension.

4. Dyslipidemia.



RECOMMENDATIONS

1. Continue current cardiovascular medications.

2. Continue wound care and antibiotics.

3. Outpatient followup upon discharge has been discussed with patient.









DD:  12/21/2018 19:01

DT:  12/21/2018 19:10

Job#:  [id number] LILI

## 2018-12-21 NOTE — NUR
RECEIVED PT IN BD AOX3 . RT FOOT WITH DRESSING .LEFT HAND 20G S/L .DENIES PAIN FAMILY AT THE 
BEDSIDE .CALL LIGHT WITH IN REACH .CONTINUE TO MONITOR

## 2018-12-22 VITALS — SYSTOLIC BLOOD PRESSURE: 163 MMHG | DIASTOLIC BLOOD PRESSURE: 78 MMHG

## 2018-12-22 VITALS — SYSTOLIC BLOOD PRESSURE: 157 MMHG | DIASTOLIC BLOOD PRESSURE: 72 MMHG

## 2018-12-22 VITALS — DIASTOLIC BLOOD PRESSURE: 62 MMHG | SYSTOLIC BLOOD PRESSURE: 129 MMHG

## 2018-12-22 VITALS — SYSTOLIC BLOOD PRESSURE: 152 MMHG | DIASTOLIC BLOOD PRESSURE: 90 MMHG

## 2018-12-22 VITALS — SYSTOLIC BLOOD PRESSURE: 148 MMHG | DIASTOLIC BLOOD PRESSURE: 72 MMHG

## 2018-12-22 VITALS — DIASTOLIC BLOOD PRESSURE: 71 MMHG | SYSTOLIC BLOOD PRESSURE: 151 MMHG

## 2018-12-22 VITALS — DIASTOLIC BLOOD PRESSURE: 72 MMHG | SYSTOLIC BLOOD PRESSURE: 135 MMHG

## 2018-12-22 VITALS — DIASTOLIC BLOOD PRESSURE: 78 MMHG | SYSTOLIC BLOOD PRESSURE: 163 MMHG

## 2018-12-22 LAB
ANION GAP SERPL CALC-SCNC: 11.4 MMOL/L (ref 8–16)
BASOPHILS # BLD AUTO: 0.1 10*3/UL (ref 0–0.1)
BASOPHILS NFR BLD AUTO: 0.7 % (ref 0–1)
BUN SERPL-MCNC: 17 MG/DL (ref 7–26)
BUN/CREAT SERPL: 18 (ref 6–25)
CALCIUM SERPL-MCNC: 9.4 MG/DL (ref 8.4–10.2)
CHLORIDE SERPL-SCNC: 100 MMOL/L (ref 98–107)
CO2 SERPL-SCNC: 30 MMOL/L (ref 22–29)
DEPRECATED NEUTROPHILS # BLD AUTO: 5.3 10*3/UL (ref 2.1–6.9)
EGFRCR SERPLBLD CKD-EPI 2021: > 60 ML/MIN (ref 60–?)
EOSINOPHIL # BLD AUTO: 0.4 10*3/UL (ref 0–0.4)
EOSINOPHIL NFR BLD AUTO: 5.1 % (ref 0–6)
ERYTHROCYTE [DISTWIDTH] IN CORD BLOOD: 12.7 % (ref 11.7–14.4)
GLUCOSE SERPLBLD-MCNC: 149 MG/DL (ref 74–118)
HCT VFR BLD AUTO: 42 % (ref 38.2–49.6)
HGB BLD-MCNC: 14.1 G/DL (ref 14–18)
LYMPHOCYTES # BLD: 1 10*3/UL (ref 1–3.2)
LYMPHOCYTES NFR BLD AUTO: 12.7 % (ref 18–39.1)
MCH RBC QN AUTO: 30.1 PG (ref 28–32)
MCHC RBC AUTO-ENTMCNC: 33.6 G/DL (ref 31–35)
MCV RBC AUTO: 89.6 FL (ref 81–99)
MONOCYTES # BLD AUTO: 0.9 10*3/UL (ref 0.2–0.8)
MONOCYTES NFR BLD AUTO: 11.5 % (ref 4.4–11.3)
NEUTS SEG NFR BLD AUTO: 69.6 % (ref 38.7–80)
PLATELET # BLD AUTO: 217 X10E3/UL (ref 140–360)
POTASSIUM SERPL-SCNC: 4.4 MMOL/L (ref 3.5–5.1)
RBC # BLD AUTO: 4.69 X10E6/UL (ref 4.3–5.7)
SODIUM SERPL-SCNC: 137 MMOL/L (ref 136–145)

## 2018-12-22 RX ADMIN — PRIMIDONE SCH MG: 50 TABLET ORAL at 08:30

## 2018-12-22 RX ADMIN — Medication SCH MLS/HR: at 04:17

## 2018-12-22 RX ADMIN — CEFEPIME HYDROCHLORIDE SCH MLS/HR: 1 INJECTION, POWDER, FOR SOLUTION INTRAMUSCULAR; INTRAVENOUS at 14:00

## 2018-12-22 RX ADMIN — Medication SCH MLS/HR: at 22:00

## 2018-12-22 RX ADMIN — LEVOTHYROXINE SODIUM SCH MCG: 50 TABLET ORAL at 05:49

## 2018-12-22 RX ADMIN — PRIMIDONE SCH MG: 50 TABLET ORAL at 17:00

## 2018-12-22 RX ADMIN — METOPROLOL SUCCINATE SCH MG: 50 TABLET, EXTENDED RELEASE ORAL at 08:30

## 2018-12-22 RX ADMIN — INSULIN LISPRO SCH UNIT: 100 INJECTION, SOLUTION INTRAVENOUS; SUBCUTANEOUS at 07:30

## 2018-12-22 RX ADMIN — LOSARTAN POTASSIUM SCH MG: 25 TABLET, FILM COATED ORAL at 22:00

## 2018-12-22 RX ADMIN — INSULIN ASPART SCH UNITS: 100 INJECTION, SUSPENSION SUBCUTANEOUS at 07:30

## 2018-12-22 RX ADMIN — CEFEPIME HYDROCHLORIDE SCH MLS/HR: 1 INJECTION, POWDER, FOR SOLUTION INTRAMUSCULAR; INTRAVENOUS at 05:49

## 2018-12-22 RX ADMIN — CLOPIDOGREL BISULFATE SCH MG: 75 TABLET, FILM COATED ORAL at 08:30

## 2018-12-22 RX ADMIN — Medication SCH MLS/HR: at 12:00

## 2018-12-22 RX ADMIN — INSULIN LISPRO SCH UNIT: 100 INJECTION, SOLUTION INTRAVENOUS; SUBCUTANEOUS at 11:30

## 2018-12-22 RX ADMIN — ASPIRIN 81 MG CHEWABLE TABLET SCH MG: 81 TABLET CHEWABLE at 08:30

## 2018-12-22 RX ADMIN — INSULIN ASPART SCH UNITS: 100 INJECTION, SUSPENSION SUBCUTANEOUS at 17:00

## 2018-12-22 RX ADMIN — INSULIN LISPRO SCH UNIT: 100 INJECTION, SOLUTION INTRAVENOUS; SUBCUTANEOUS at 16:30

## 2018-12-22 RX ADMIN — Medication SCH MG: at 22:00

## 2018-12-22 RX ADMIN — CEFEPIME HYDROCHLORIDE SCH MLS/HR: 1 INJECTION, POWDER, FOR SOLUTION INTRAMUSCULAR; INTRAVENOUS at 23:00

## 2018-12-22 RX ADMIN — COLLAGENASE SANTYL SCH GM: 250 OINTMENT TOPICAL at 10:30

## 2018-12-22 RX ADMIN — INSULIN LISPRO SCH UNIT: 100 INJECTION, SOLUTION INTRAVENOUS; SUBCUTANEOUS at 22:21

## 2018-12-22 RX ADMIN — CEFEPIME HYDROCHLORIDE SCH MLS/HR: 1 INJECTION, POWDER, FOR SOLUTION INTRAMUSCULAR; INTRAVENOUS at 15:50

## 2018-12-22 NOTE — NUR
Patient visited in room during nursing rounds. Patient alert and oriented x3. Wife at 
bedside. Right great toe covered with medicated gauze and kerlix appearing clean and dry. 
Patient on scheduled IV antibiotics. Patient ambulatory and denies pain or discomfort at 
this time. Will continue to monitor.

## 2018-12-22 NOTE — PROGRESS NOTE
DATE:  December 22, 2018 



SUBJECTIVE:  Patient at bedside, feeling much better.  Ulceration to the 

right great toe continues to improve down to very close to bone.  

Granulation tissue noted.  No drainage.  Skin temperature is symmetrical 

and decreased edema noted.



ASSESSMENT:  Grade IV ulcer, distal aspect right great toe with possible 

osteomyelitis with diabetic neuropathy and peripheral artery disease with 

skin temperature warm to touch.



PLAN:  We will continue IV antibiotics.  Continue local wound care.  

Patient may possibly be discharged tomorrow.









DD:  12/22/2018 16:55

DT:  12/22/2018 17:13

Job#:  [id number] LILI

## 2018-12-22 NOTE — PROGRESS NOTE
DATE:  December 22, 2018 



CARDIOLOGY PROGRESS NOTE



SUBJECTIVE:  No new complaints.



OBJECTIVE

VITAL SIGNS:  Temperature 97.6, heart rate 61, respiratory rate 20, blood 

pressure 151/71, O2 sat 98% on room air.

GENERAL:  In no acute distress.  Alert, active.

NECK:  No JVD.

CHEST:  Clear to auscultation.

CARDIOVASCULAR:  Regular rate and rhythm.  Normal S1 and S2.  No S3.  No 

S4.  No murmurs or rubs.

ABDOMEN:  Soft, nontender.

EXTREMITIES:  Trace edema.  Right foot covered with dressings.



CARDIOVASCULAR MEDICATIONS:  Reviewed.



STUDIES:  Potassium 4.4, creatinine 0.9.  Hemoglobin 14.1.  AST 14, ALT 18.



ASSESSMENT

1. Peripheral arterial disease, status post revascularization of the 

right lower extremity.

2. Right first toe ulcer, improving.

3. Diabetes mellitus type 2.

4. Hypertension, uncontrolled.

5. Dyslipidemia.





RECOMMENDATIONS:  Add amlodipine 5 mg daily and continue rest of current 

cardiovascular medications.









DD:  12/22/2018 17:57

DT:  12/22/2018 18:13

Job#:  [id number] ELVIN

## 2018-12-23 VITALS — SYSTOLIC BLOOD PRESSURE: 123 MMHG | DIASTOLIC BLOOD PRESSURE: 64 MMHG

## 2018-12-23 VITALS — DIASTOLIC BLOOD PRESSURE: 75 MMHG | SYSTOLIC BLOOD PRESSURE: 138 MMHG

## 2018-12-23 VITALS — SYSTOLIC BLOOD PRESSURE: 133 MMHG | DIASTOLIC BLOOD PRESSURE: 67 MMHG

## 2018-12-23 VITALS — DIASTOLIC BLOOD PRESSURE: 67 MMHG | SYSTOLIC BLOOD PRESSURE: 133 MMHG

## 2018-12-23 VITALS — SYSTOLIC BLOOD PRESSURE: 145 MMHG | DIASTOLIC BLOOD PRESSURE: 73 MMHG

## 2018-12-23 PROCEDURE — 0QBQ0ZZ EXCISION OF RIGHT TOE PHALANX, OPEN APPROACH: ICD-10-PCS | Performed by: PODIATRIST

## 2018-12-23 RX ADMIN — INSULIN LISPRO SCH UNIT: 100 INJECTION, SOLUTION INTRAVENOUS; SUBCUTANEOUS at 08:10

## 2018-12-23 RX ADMIN — CLOPIDOGREL BISULFATE SCH MG: 75 TABLET, FILM COATED ORAL at 08:31

## 2018-12-23 RX ADMIN — INSULIN ASPART SCH UNITS: 100 INJECTION, SUSPENSION SUBCUTANEOUS at 08:10

## 2018-12-23 RX ADMIN — INSULIN LISPRO SCH UNIT: 100 INJECTION, SOLUTION INTRAVENOUS; SUBCUTANEOUS at 12:27

## 2018-12-23 RX ADMIN — LEVOTHYROXINE SODIUM SCH MCG: 50 TABLET ORAL at 06:30

## 2018-12-23 RX ADMIN — METOPROLOL SUCCINATE SCH MG: 50 TABLET, EXTENDED RELEASE ORAL at 08:31

## 2018-12-23 RX ADMIN — COLLAGENASE SANTYL SCH GM: 250 OINTMENT TOPICAL at 10:05

## 2018-12-23 RX ADMIN — ASPIRIN 81 MG CHEWABLE TABLET SCH MG: 81 TABLET CHEWABLE at 08:31

## 2018-12-23 RX ADMIN — PRIMIDONE SCH MG: 50 TABLET ORAL at 08:31

## 2018-12-23 NOTE — DISCHARGE SUMMARY
PRIMARY CARE PHYSICIAN:  Dr. Chidi Powell.



CONSULTANTS

1. Dr. Jesús Camp.

2. Dr. John Malin.



FINAL DIAGNOSES

1. Right toe infected diabetic foot abscess, status post right great toe 

debridement.  The debridement is involving the bone, there is no 

osteomyelitis.

2. Peripheral vascular disease, status post angiogram with intervention, 

angioplasty and thrombectomy of the right peroneal artery.

3. Diabetes type 2, on insulin therapy.  Glycohemoglobin A1c was 7.9.



SUMMARY:  Patient is a 73-year-old male with infected right great toe 

diabetic toe ulcer associated with infection and abscess.  The patient did 

have partial toe debridement with scraping of the bone; however, there was 

no osteomyelitis.  Patient has received IV antibiotics during 

hospitalization with cefepime and ampicillin.  The microbiology grew 

Pseudomonas aeruginosa and Enterococcus faecalis.  The patient is stable.  

He has received appropriate antibiotics.  He is stable at this time.  

Patient will be discharged home with ampicillin 500 mg 3 times a day for 10 

days, doxycycline monohydrate 100 mg twice a day for 15 days, and Cipro 500 

mg twice a day for 7 days.  The patient is otherwise stable at this time.  

She is doing well.  The wound care has been given to instruction.  He will 

be also discharged home with medication for his circulation status post 

angioplasty including Plavix 75 mg daily, Lipitor 40 mg q.h.s., and Ecotrin 

81 mg daily.  His blood pressure is better controlled with Norvasc and 

Toprol XL will also give the patient as well.  Currently, the patient is 

stable, discharged home with the following instructions:

1. Wound care instructions from Dr. Jesús Camp.

2. Medications including doxycycline mononitrate 100 mg twice a day for 

15 days, ampicillin 500 mg 3 times a day for 10 days, Cipro 500 mg 

b.i.d. for 10 days, Tylenol No. 3 p.r.n. for pain, Plavix 75 mg daily, 

Norvasc 5 mg daily, Toprol XL 50 mg nightly, Lipitor 40 mg nightly, and 

Ecotrin 81 mg daily.





All instructions were given.  Patient is to follow up with primary care 

physician, Dr. Chidi Powell next week.









DD:  12/23/2018 11:24

DT:  12/23/2018 12:21

Job#:  [id number] MARGARET

## 2018-12-23 NOTE — PROGRESS NOTE
DATE:  December 23, 2018 



SUBJECTIVE:  Patient seen at bedside, ready to go home.  Doing well.  

Denies any history of fever, chills, nausea, or vomiting.



OBJECTIVE

VITAL SIGNS:  Afebrile, pulse rate 78, respirations 18, blood pressure 

133/67, O2 saturation 97%.

EXTREMITIES:  Ulceration down to bone, bone felt through the ulceration 

site.  Ulcer approximately 2 to 2.5 cm in diameter with some fibrosis and 

necrosis noted down to bone.



LABS:  Noted.  Has a white blood cell count of 7.6.



ASSESSMENT:  Osteomyelitis with grade 4 ulcer, cellulitis, diabetic 

neuropathy with peripheral arterial disease with skin temperature warm to 

touch.  Responding to antibiotics and local wound care.



PLAN:  Sharp excisional debridement of the ulcer was carried down to bone.  

Bone was rasped via sharp dissection.  Bleeding tissue was achieved.  

Sterile dressing was applied.  We will continue Santyl, diluted wet-to-dry 

Betadine at home.  Prescription for doxycycline will be given upon 

discharge, 100 mg to be taken twice a day.  Patient would follow up towards 

the end of the week.  We will continue debridement to try to salvage toe 

and foot.









DD:  12/23/2018 11:16

DT:  12/23/2018 11:37

Job#:  [id number] GABRIEL

## 2019-01-08 NOTE — OPERATIVE REPORT
DATE OF PROCEDURE:  December 14, 2018 

 

PERIPHERAL ANGIOGRAPHY AND INTERVENTION



PROCEDURE INDICATION:  Critical limb ischemia, peripheral arterial disease.



PROCEDURES PERFORMED

1. Abdominal aortogram.

2. Selective catheter placement from left common femoral artery to right 

common femoral artery.

3. Additional third-order catheter placement from left common femoral 

artery to right popliteal artery for additional angiography. 

4. Right peroneal [employer] atherectomy with a Micro 

[place].

5. Right peroneal percutaneous transluminal angioplasty with a 2.0 x 150 

Ultraverse balloon.

6. Right anterior tibial percutaneous transluminal angioplasty with 2.5 

x 40 and 2.5 x 120 Ultraverse balloons.

7. Left common femoral artery 6-[place]rist[place] Angio-Seal closure.



PROCEDURE COMPLICATIONS:  None.



ESTIMATED BLOOD LOSS:  Less than 15 mL.



PROCEDURE SUMMARY:  After consent was obtained, patient was prepped and 

draped in a sterile fashion, and the left femoral site was locally 

infiltrated with 2% lidocaine. Access was obtained with a micropuncture, 

and an Omni Flush catheter was positioned into the distal descending 

abdominal aorta for angiography, revealing luminal irregularities 

throughout the distal descending abdominal aorta, renal arteries and iliac 

arteries bilaterally. The common femoral, profunda femoris and bilateral 

SFAs were patent with luminal irregularities. The right anterior tibial was 

100% occluded with a long segment of stenosis. The peroneal artery was 90% 

stenosed. The posterior tibial artery was patent. The left posterior tibial 

artery and peroneal arteries as well as TP trunk were all patent. The left 

anterior tibial was 100% occluded proximal to distal. An up-and-over sheath 

6 [place] in size was positioned in the right common femoral artery and then 

in an adequate position in the popliteal artery for a selective angiography 

with findings as described above. Intervention was then attempted with a 

wire used to cross the area of peroneal artery, and atherectomy was 

performed with a CSI 1.25 shashank with a couple of passes at slow speed 

followed by peroneal PTA with a 2.0 x 150 balloon. Excellent angiographic 

results. After this the right anterior tibial artery was crossed with a 

Runthrough wire, and predilatation was performed with a 2.5 x 150 

Ultraverse balloon, and an additional area of recoil was treated with a 2.5 

x 40 Ultraverse balloon with excellent final angiographic results, 

reestablishment of 3-vessel runoff to the foot through the right lower 

extremity, GONSALO 3 flow, less than 10% residual stenosis across the right 

anterior tibial and right peroneal artery. 



CONCLUSION:  Successful right peroneal [employer] 

atherectomy and percutaneous transluminal angioplasty and right anterior 

tibial percutaneous transluminal angioplasty.



RECOMMENDATIONS:   Aspirin and Plavix. Bed rest.









DD:  01/08/2019 16:26

DT:  01/08/2019 16:42

Job#:  [id number]

## 2019-09-06 ENCOUNTER — HOSPITAL ENCOUNTER (INPATIENT)
Dept: HOSPITAL 88 - ER | Age: 74
LOS: 12 days | Discharge: HOME HEALTH | DRG: 264 | End: 2019-09-18
Attending: INTERNAL MEDICINE | Admitting: INTERNAL MEDICINE
Payer: COMMERCIAL

## 2019-09-06 VITALS — HEIGHT: 66 IN | BODY MASS INDEX: 30.79 KG/M2 | WEIGHT: 191.56 LBS

## 2019-09-06 DIAGNOSIS — M79.671: ICD-10-CM

## 2019-09-06 DIAGNOSIS — L97.518: ICD-10-CM

## 2019-09-06 DIAGNOSIS — I96: ICD-10-CM

## 2019-09-06 DIAGNOSIS — L03.115: ICD-10-CM

## 2019-09-06 DIAGNOSIS — L03.031: ICD-10-CM

## 2019-09-06 DIAGNOSIS — E11.69: ICD-10-CM

## 2019-09-06 DIAGNOSIS — L97.519: ICD-10-CM

## 2019-09-06 DIAGNOSIS — E11.40: ICD-10-CM

## 2019-09-06 DIAGNOSIS — E11.621: ICD-10-CM

## 2019-09-06 DIAGNOSIS — M86.8X7: ICD-10-CM

## 2019-09-06 DIAGNOSIS — E11.52: Primary | ICD-10-CM

## 2019-09-06 DIAGNOSIS — Z79.4: ICD-10-CM

## 2019-09-06 DIAGNOSIS — M86.9: ICD-10-CM

## 2019-09-06 LAB
ALBUMIN SERPL-MCNC: 4 G/DL (ref 3.5–5)
ALBUMIN/GLOB SERPL: 1.1 {RATIO} (ref 0.8–2)
ALP SERPL-CCNC: 90 IU/L (ref 40–150)
ALT SERPL-CCNC: 17 IU/L (ref 0–55)
ANION GAP SERPL CALC-SCNC: 16.6 MMOL/L (ref 8–16)
BACTERIA URNS QL MICRO: (no result) /HPF
BASOPHILS # BLD AUTO: 0 10*3/UL (ref 0–0.1)
BASOPHILS NFR BLD AUTO: 0.2 % (ref 0–1)
BILIRUB UR QL: NEGATIVE
BUN SERPL-MCNC: 32 MG/DL (ref 7–26)
BUN/CREAT SERPL: 20 (ref 6–25)
CALCIUM SERPL-MCNC: 10.2 MG/DL (ref 8.4–10.2)
CHLORIDE SERPL-SCNC: 97 MMOL/L (ref 98–107)
CLARITY UR: CLEAR
CO2 SERPL-SCNC: 24 MMOL/L (ref 22–29)
COLOR UR: YELLOW
DEPRECATED NEUTROPHILS # BLD AUTO: 8.9 10*3/UL (ref 2.1–6.9)
DEPRECATED RBC URNS MANUAL-ACNC: (no result) /HPF (ref 0–5)
EGFRCR SERPLBLD CKD-EPI 2021: 41 ML/MIN (ref 60–?)
EOSINOPHIL # BLD AUTO: 0.3 10*3/UL (ref 0–0.4)
EOSINOPHIL NFR BLD AUTO: 2.5 % (ref 0–6)
EPI CELLS URNS QL MICRO: (no result) /LPF
ERYTHROCYTE [DISTWIDTH] IN CORD BLOOD: 12.5 % (ref 11.7–14.4)
GLOBULIN PLAS-MCNC: 3.6 G/DL (ref 2.3–3.5)
GLUCOSE SERPLBLD-MCNC: 195 MG/DL (ref 74–118)
HCT VFR BLD AUTO: 41.7 % (ref 38.2–49.6)
HGB BLD-MCNC: 14.1 G/DL (ref 14–18)
KETONES UR QL STRIP.AUTO: NEGATIVE
LEUKOCYTE ESTERASE UR QL STRIP.AUTO: NEGATIVE
LYMPHOCYTES # BLD: 0.8 10*3/UL (ref 1–3.2)
LYMPHOCYTES NFR BLD AUTO: 7.4 % (ref 18–39.1)
MCH RBC QN AUTO: 30.5 PG (ref 28–32)
MCHC RBC AUTO-ENTMCNC: 33.8 G/DL (ref 31–35)
MCV RBC AUTO: 90.1 FL (ref 81–99)
MONOCYTES # BLD AUTO: 1.2 10*3/UL (ref 0.2–0.8)
MONOCYTES NFR BLD AUTO: 10.8 % (ref 4.4–11.3)
NEUTS SEG NFR BLD AUTO: 78.9 % (ref 38.7–80)
NITRITE UR QL STRIP.AUTO: NEGATIVE
PLATELET # BLD AUTO: 215 X10E3/UL (ref 140–360)
POTASSIUM SERPL-SCNC: 4.6 MMOL/L (ref 3.5–5.1)
PROT UR QL STRIP.AUTO: NEGATIVE
RBC # BLD AUTO: 4.63 X10E6/UL (ref 4.3–5.7)
SODIUM SERPL-SCNC: 133 MMOL/L (ref 136–145)
SP GR UR STRIP: 1.01 (ref 1.01–1.02)
UROBILINOGEN UR STRIP-MCNC: 0.2 MG/DL (ref 0.2–1)
WBC #/AREA URNS HPF: (no result) /HPF (ref 0–5)

## 2019-09-06 PROCEDURE — 99284 EMERGENCY DEPT VISIT MOD MDM: CPT

## 2019-09-06 PROCEDURE — 72220 X-RAY EXAM SACRUM TAILBONE: CPT

## 2019-09-06 PROCEDURE — 80053 COMPREHEN METABOLIC PANEL: CPT

## 2019-09-06 PROCEDURE — 80202 ASSAY OF VANCOMYCIN: CPT

## 2019-09-06 PROCEDURE — 85651 RBC SED RATE NONAUTOMATED: CPT

## 2019-09-06 PROCEDURE — 36415 COLL VENOUS BLD VENIPUNCTURE: CPT

## 2019-09-06 PROCEDURE — 81001 URINALYSIS AUTO W/SCOPE: CPT

## 2019-09-06 PROCEDURE — 85025 COMPLETE CBC W/AUTO DIFF WBC: CPT

## 2019-09-06 PROCEDURE — 80048 BASIC METABOLIC PNL TOTAL CA: CPT

## 2019-09-06 PROCEDURE — 87040 BLOOD CULTURE FOR BACTERIA: CPT

## 2019-09-06 PROCEDURE — 82948 REAGENT STRIP/BLOOD GLUCOSE: CPT

## 2019-09-06 PROCEDURE — 83605 ASSAY OF LACTIC ACID: CPT

## 2019-09-06 PROCEDURE — 87086 URINE CULTURE/COLONY COUNT: CPT

## 2019-09-06 RX ADMIN — VANCOMYCIN HYDROCHLORIDE SCH MLS/HR: 1 INJECTION, SOLUTION INTRAVENOUS at 22:30

## 2019-09-06 NOTE — NUR
PT REPORTS HAS PROSTHETIC/KNEE REPLACEMENT R KNEE, DONE HERE IN DECEMBER 2018, 
MD AND RADIOLOGY AWARE

## 2019-09-06 NOTE — DIAGNOSTIC IMAGING REPORT
Foot complete



CPT code: 43310



Indication: Wound 

^WOUND

^[id number]

^2136



Technique: Portable A.P., oblique and lateral views of the right foot obtained.



Comparison: 12/11/2018, MRI right foot 12/12/2018



Findings:

The area of ulceration is not indicated or marked.



Calcaneus is intact with prominent plantar and posterior spurs.  The midfoot is

intact without focal osseous lesions.



Erosive changes of the distal tuft of the first digit have developed, the

result of osteomyelitis. No periosteal new bone formation in this region. The

remainder of the digit is intact. Well-defined lucency in the head of the

proximal phalanx of the first digit is stable. The remainder of the digits are

intact and normal in morphology without focal erosive changes.



Diffuse arterial calcifications. No air or radiopaque foreign bodies in the

soft tissues. 



IMPRESSION:



No radiographic evidence of acute osteomyelitis. Sequela of osteomyelitis of

the distal phalanx of the first digit.



Signed by: Dr. Grace Keita MD on 9/6/2019 10:00 PM

## 2019-09-07 VITALS — DIASTOLIC BLOOD PRESSURE: 78 MMHG | SYSTOLIC BLOOD PRESSURE: 119 MMHG

## 2019-09-07 VITALS — DIASTOLIC BLOOD PRESSURE: 74 MMHG | SYSTOLIC BLOOD PRESSURE: 130 MMHG

## 2019-09-07 VITALS — SYSTOLIC BLOOD PRESSURE: 133 MMHG | DIASTOLIC BLOOD PRESSURE: 74 MMHG

## 2019-09-07 VITALS — SYSTOLIC BLOOD PRESSURE: 130 MMHG | DIASTOLIC BLOOD PRESSURE: 74 MMHG

## 2019-09-07 VITALS — DIASTOLIC BLOOD PRESSURE: 68 MMHG | SYSTOLIC BLOOD PRESSURE: 118 MMHG

## 2019-09-07 VITALS — DIASTOLIC BLOOD PRESSURE: 74 MMHG | SYSTOLIC BLOOD PRESSURE: 133 MMHG

## 2019-09-07 VITALS — SYSTOLIC BLOOD PRESSURE: 142 MMHG | DIASTOLIC BLOOD PRESSURE: 83 MMHG

## 2019-09-07 VITALS — DIASTOLIC BLOOD PRESSURE: 81 MMHG | SYSTOLIC BLOOD PRESSURE: 142 MMHG

## 2019-09-07 VITALS — DIASTOLIC BLOOD PRESSURE: 83 MMHG | SYSTOLIC BLOOD PRESSURE: 142 MMHG

## 2019-09-07 LAB
ALBUMIN SERPL-MCNC: 3.4 G/DL (ref 3.5–5)
ALBUMIN/GLOB SERPL: 1.1 {RATIO} (ref 0.8–2)
ALP SERPL-CCNC: 75 IU/L (ref 40–150)
ALT SERPL-CCNC: 14 IU/L (ref 0–55)
ANION GAP SERPL CALC-SCNC: 13.4 MMOL/L (ref 8–16)
BASOPHILS # BLD AUTO: 0 10*3/UL (ref 0–0.1)
BASOPHILS NFR BLD AUTO: 0.4 % (ref 0–1)
BUN SERPL-MCNC: 29 MG/DL (ref 7–26)
BUN/CREAT SERPL: 25 (ref 6–25)
CALCIUM SERPL-MCNC: 9.6 MG/DL (ref 8.4–10.2)
CHLORIDE SERPL-SCNC: 104 MMOL/L (ref 98–107)
CO2 SERPL-SCNC: 25 MMOL/L (ref 22–29)
DEPRECATED NEUTROPHILS # BLD AUTO: 5.7 10*3/UL (ref 2.1–6.9)
EGFRCR SERPLBLD CKD-EPI 2021: > 60 ML/MIN (ref 60–?)
EOSINOPHIL # BLD AUTO: 0.2 10*3/UL (ref 0–0.4)
EOSINOPHIL NFR BLD AUTO: 2.9 % (ref 0–6)
ERYTHROCYTE [DISTWIDTH] IN CORD BLOOD: 12.6 % (ref 11.7–14.4)
GLOBULIN PLAS-MCNC: 3.1 G/DL (ref 2.3–3.5)
GLUCOSE SERPLBLD-MCNC: 126 MG/DL (ref 74–118)
HCT VFR BLD AUTO: 37.7 % (ref 38.2–49.6)
HGB BLD-MCNC: 12.6 G/DL (ref 14–18)
LYMPHOCYTES # BLD: 1.2 10*3/UL (ref 1–3.2)
LYMPHOCYTES NFR BLD AUTO: 14.6 % (ref 18–39.1)
MCH RBC QN AUTO: 30.1 PG (ref 28–32)
MCHC RBC AUTO-ENTMCNC: 33.4 G/DL (ref 31–35)
MCV RBC AUTO: 90.2 FL (ref 81–99)
MONOCYTES # BLD AUTO: 1.1 10*3/UL (ref 0.2–0.8)
MONOCYTES NFR BLD AUTO: 13.5 % (ref 4.4–11.3)
NEUTS SEG NFR BLD AUTO: 68.2 % (ref 38.7–80)
PLATELET # BLD AUTO: 188 X10E3/UL (ref 140–360)
POTASSIUM SERPL-SCNC: 4.4 MMOL/L (ref 3.5–5.1)
RBC # BLD AUTO: 4.18 X10E6/UL (ref 4.3–5.7)
SODIUM SERPL-SCNC: 138 MMOL/L (ref 136–145)

## 2019-09-07 RX ADMIN — INSULIN LISPRO SCH UNIT: 100 INJECTION, SOLUTION INTRAVENOUS; SUBCUTANEOUS at 17:20

## 2019-09-07 RX ADMIN — INSULIN LISPRO SCH UNIT: 100 INJECTION, SOLUTION INTRAVENOUS; SUBCUTANEOUS at 22:13

## 2019-09-07 RX ADMIN — ENOXAPARIN SODIUM SCH MG: 40 INJECTION SUBCUTANEOUS at 17:23

## 2019-09-07 RX ADMIN — Medication SCH MG: at 21:00

## 2019-09-07 RX ADMIN — INSULIN GLARGINE SCH UNITS: 100 INJECTION, SOLUTION SUBCUTANEOUS at 17:20

## 2019-09-07 RX ADMIN — METOPROLOL SUCCINATE SCH MG: 25 TABLET, EXTENDED RELEASE ORAL at 21:00

## 2019-09-07 RX ADMIN — VANCOMYCIN HYDROCHLORIDE SCH MLS/HR: 1 INJECTION, SOLUTION INTRAVENOUS at 21:01

## 2019-09-07 RX ADMIN — PRIMIDONE SCH MG: 50 TABLET ORAL at 17:23

## 2019-09-07 RX ADMIN — TAZOBACTAM SODIUM AND PIPERACILLIN SODIUM SCH MLS/HR: 375; 3 INJECTION, SOLUTION INTRAVENOUS at 23:58

## 2019-09-07 RX ADMIN — TAZOBACTAM SODIUM AND PIPERACILLIN SODIUM SCH MLS/HR: 375; 3 INJECTION, SOLUTION INTRAVENOUS at 17:23

## 2019-09-07 NOTE — CONSULTATION
DATE OF CONSULTATION:  09/07/2019  

 

REASON FOR CONSULTATION:  Ulceration to the right foot with the patient being an

insulin-dependent diabetic. 

 

HISTORY OF PRESENT ILLNESS:  This is a pleasant 74-year-old  male who is well

known to me from previous admissions, who relates that he has had an ulceration to the

plantar aspect of right foot for more than two weeks now.  He is currently denying any

history of fever, chills, nausea, or vomiting, relates his foot started getting worse

with cellulitis to the dorsal aspect ascending up to his knee. 

 

PAST MEDICAL HISTORY:  Remarkable for insulin-dependent diabetes, hypertension, and

hypercholesteremia with thyroid problems. 

 

PAST SURGICAL HISTORY:  Remarkable for right great toe debridement.

 

ALLERGIES:  THE PATIENT DENIES.

 

SOCIAL HISTORY:  Denies any smoking, drinking, or recreational drug use.  Does not work.

 

FAMILY HISTORY:  Noncontributory.

 

CURRENT MEDICATIONS:  Note listed in chart including IV vancomycin 1 g.  We will

continue 1 g every 24 hours. 

 

REVIEW OF SYSTEMS:

CARDIAC:  He is denying any palpitations or arrhythmias. 

RESPIRATORY:  Denies any shortness of breath or productive cough. 

GASTROINTESTINAL:  Denies any diarrhea or constipation. 

GENITOURINARY:  Denies hematuria or problems with voiding.

 

PHYSICAL EXAMINATION:

VITAL SIGNS:  Afebrile, pulse rate 78, respirations 20, blood pressure 133/74, and O2

saturation 98% 

 

Podiatric physical examination reveals the following: 

VASCULATURE:  Pedal pulses of both the DP and PT are diminished. 

SKIN:  Temperature warm to touch.  CFT to all toes less than 5 seconds. 

NEUROLOGIC:  There was a complete loss of protective sensation when utilizing

[place] 5% monofilament wire. 

MUSCULOSKELETAL:  Muscle mass to be asymmetrical.  Some swelling noted to the right foot

when compared to left.  Muscle strength to be 4/5 to all muscle groups. 

DERMATOLOGICAL:  There was a grade 3 ulcer, plantar aspect 5th metatarsophalangeal

joint/the head measuring more than 2-3 cm in diameter, some necrosis noted possibly

tracking down to bone. 

LABORATORY DATA:  Labs noted.  He has a white blood cell count dropping from 11.2 to

8.3, hemoglobin 12.6, hematocrit 37.7 with a platelet count of 188.  He has a blood

glucose of 97. 

 

ASSESSMENT:  Possible osteomyelitis of 5th metatarsal head, right foot, grade 3 ulcer,

diabetic neuropathy with cellulitis. 

 

PLAN:  We will continue IV antibiotics with vancomycin.  We will start Santyl followed

by diluted wet-to-dry Betadine.  The ulceration will be debrided tomorrow at bedside and

deep cultures will be taken.  We will treat the patient conservatively for now.  The

patient understands if not responsive following debridement, may end up needing a bone

debridement, possible partial amputation of foot. 

 

 

 

 

______________________________

NYA Chavez/ELISE

D:  09/07/2019 08:56:57

T:  09/07/2019 15:38:49

Job #:  [id number]

## 2019-09-07 NOTE — NUR
Nutrition Screen Note



RD Recommendation for Physician: Continue diet as ordered 



Plan of Care: RD following, monitoring for tolerance and adequacy



Nutrition reason for involvement:

 Nutrition Risk Trigger - MST



Primary Diagnose(s): diabetes foot ulcer



PMH: Hypothyroid, T2DM, HTN



Ht:69 in 

Wt:184.5lb

BMI:27.2 kg/m2

IBW:lb +/-10%



RD Assessment:

(9/7/2019) Chart reviewed. Labs and meds reviewed. Initial encounter with patient. Pt Denies 
any known food allergies, Nausea, Vomiting, or Diarrhea. Pt denies any difficulty chewing or 
swallowing. Eating well PTA.



Current Diet: 1800 ADA



Malnutrition Evaluation (9/7/2019)

The patient does not meet criteria for a specified degree of malnutrition at this time. Will 
re-evaluate at follow-up as appropriate. 







Diet Education Needs Assessment:

Diet education not desired

 





Nutrition Care Level: Low





Signed: Mayco Pratt RD, LD, [facility]

## 2019-09-07 NOTE — HISTORY AND PHYSICAL
PRIMARY CARE PHYSICIAN:  Chidi Powell MD.

 

CONSULTANT:  Jesús Camp DPM.

 

CHIEF COMPLAINT:  Right diabetic foot ulcer associated with a puncture wound due to the

shoes. 

 

HISTORY OF PRESENT ILLNESS:  74-year-old male, approximately few weeks ago had an injury

to the right foot.  Did not think anything of it, but for the past week or so, per

spouse, the patient having increase in swelling of the right foot with increasing

redness.  Apparently, he had a nail punctured through the shoes and then developed a

small puncture.  The patient is not carefully checking his foot on a daily basis and

developed a significant increase in swelling, and by the time the wife noticed, it was

bluish, swelling with an abscess and necrotic tissue along with redness along the dorsal

surface of the right foot.  The patient was admitted for further antibiotic treatment. 

 

PAST MEDICAL HISTORY:  Diabetes type 2 both on oral medication and an insulin.

Dyslipidemia, hypertension, and peripheral vascular disease on anticoagulant therapy. 

 

PAST SURGICAL HISTORY:  Right foot surgery.

 

SOCIAL HISTORY:  The patient does not smoke or use alcohol.  No regular drugs.

 

ALLERGIES:  NO KNOWN ALLERGIES.

 

HOME MEDICATIONS:  List is reviewed.

 

REVIEW OF SYSTEMS:

Right foot swelling and redness, but no significant pain due to diabetic neuropathy.

 

PHYSICAL EXAMINATION:

VITAL SIGNS:  Temperature is 98, blood pressure 119/78, pulse rate 77, and respirations

18. 

GENERAL:  The patient is not in acute distress. 

HEENT:  Normocephalic and atraumatic.  Anicteric. 

NECK:  Supple grossly. 

PULMONARY:  Clear. 

CARDIOVASCULAR:  Regular rate and rhythm. 

ABDOMEN:  Soft, unremarkable. 

EXTREMITIES:  Right foot on the lateral bottom has superior area below the 5th toe,

there is an open quarter-sized with necrotic tissue and fluffy tissue noticed, possible

abscess.  There is also spreading redness along the right dorsal surface of the foot as

well.  There has been no significant tenderness due to diabetic neuropathy.  There is no

drainage at this time. 

NEUROLOGIC:  Diabetic neuropathy.  Moving all extremities without any focal deficit.

LABORATORY DATA:  Sodium 138, potassium 4.4, chloride 104, bicarb 25, BUN 29, creatinine

1.16, and glucose 126. 

 

WBC is 8.4, hemoglobin 12.6, hematocrit 37.7, and platelets is 188. 

 

MRI of the right foot is pending.

 

IMPRESSION:  

1. Right infected diabetic foot ulcer on the right foot associated with a puncture wound

to the shoes. 

2. Multiple chronic baseline problems.

 

PLAN:  Zosyn, IV antibiotics, vancomycin.  Sedimentation rate.  The patient will need

surgical intervention.  Insulin sliding scale coverage.  Home medication.  We will hold

metformin due to MRI.  We will follow up on the patient's status. 

 

 

 

 

______________________________

MD RENETTA Rodriguez/ELISE

D:  09/07/2019 16:11:51

T:  09/07/2019 20:50:43

Job #:  [id number]

## 2019-09-08 VITALS — SYSTOLIC BLOOD PRESSURE: 118 MMHG | DIASTOLIC BLOOD PRESSURE: 83 MMHG

## 2019-09-08 VITALS — DIASTOLIC BLOOD PRESSURE: 71 MMHG | SYSTOLIC BLOOD PRESSURE: 121 MMHG

## 2019-09-08 VITALS — DIASTOLIC BLOOD PRESSURE: 72 MMHG | SYSTOLIC BLOOD PRESSURE: 140 MMHG

## 2019-09-08 VITALS — SYSTOLIC BLOOD PRESSURE: 121 MMHG | DIASTOLIC BLOOD PRESSURE: 71 MMHG

## 2019-09-08 VITALS — DIASTOLIC BLOOD PRESSURE: 84 MMHG | SYSTOLIC BLOOD PRESSURE: 132 MMHG

## 2019-09-08 VITALS — SYSTOLIC BLOOD PRESSURE: 121 MMHG | DIASTOLIC BLOOD PRESSURE: 73 MMHG

## 2019-09-08 VITALS — DIASTOLIC BLOOD PRESSURE: 71 MMHG | SYSTOLIC BLOOD PRESSURE: 116 MMHG

## 2019-09-08 LAB
BASOPHILS # BLD AUTO: 0 10*3/UL (ref 0–0.1)
BASOPHILS NFR BLD AUTO: 0.3 % (ref 0–1)
DEPRECATED NEUTROPHILS # BLD AUTO: 8.5 10*3/UL (ref 2.1–6.9)
EOSINOPHIL # BLD AUTO: 0.2 10*3/UL (ref 0–0.4)
EOSINOPHIL NFR BLD AUTO: 1.6 % (ref 0–6)
ERYTHROCYTE [DISTWIDTH] IN CORD BLOOD: 12.3 % (ref 11.7–14.4)
HCT VFR BLD AUTO: 39.4 % (ref 38.2–49.6)
HGB BLD-MCNC: 13.2 G/DL (ref 14–18)
LYMPHOCYTES # BLD: 0.7 10*3/UL (ref 1–3.2)
LYMPHOCYTES NFR BLD AUTO: 6.9 % (ref 18–39.1)
MCH RBC QN AUTO: 29.9 PG (ref 28–32)
MCHC RBC AUTO-ENTMCNC: 33.5 G/DL (ref 31–35)
MCV RBC AUTO: 89.1 FL (ref 81–99)
MONOCYTES # BLD AUTO: 1.1 10*3/UL (ref 0.2–0.8)
MONOCYTES NFR BLD AUTO: 10 % (ref 4.4–11.3)
NEUTS SEG NFR BLD AUTO: 80.8 % (ref 38.7–80)
PLATELET # BLD AUTO: 196 X10E3/UL (ref 140–360)
RBC # BLD AUTO: 4.42 X10E6/UL (ref 4.3–5.7)

## 2019-09-08 PROCEDURE — 0JBQ0ZZ EXCISION OF RIGHT FOOT SUBCUTANEOUS TISSUE AND FASCIA, OPEN APPROACH: ICD-10-PCS | Performed by: PODIATRIST

## 2019-09-08 RX ADMIN — VANCOMYCIN HYDROCHLORIDE SCH MLS/HR: 1 INJECTION, SOLUTION INTRAVENOUS at 21:45

## 2019-09-08 RX ADMIN — TAZOBACTAM SODIUM AND PIPERACILLIN SODIUM SCH MLS/HR: 375; 3 INJECTION, SOLUTION INTRAVENOUS at 23:41

## 2019-09-08 RX ADMIN — INSULIN LISPRO SCH UNIT: 100 INJECTION, SOLUTION INTRAVENOUS; SUBCUTANEOUS at 17:35

## 2019-09-08 RX ADMIN — ENOXAPARIN SODIUM SCH MG: 40 INJECTION SUBCUTANEOUS at 17:34

## 2019-09-08 RX ADMIN — TAZOBACTAM SODIUM AND PIPERACILLIN SODIUM SCH MLS/HR: 375; 3 INJECTION, SOLUTION INTRAVENOUS at 17:35

## 2019-09-08 RX ADMIN — TAZOBACTAM SODIUM AND PIPERACILLIN SODIUM SCH MLS/HR: 375; 3 INJECTION, SOLUTION INTRAVENOUS at 06:14

## 2019-09-08 RX ADMIN — INSULIN LISPRO SCH UNIT: 100 INJECTION, SOLUTION INTRAVENOUS; SUBCUTANEOUS at 12:26

## 2019-09-08 RX ADMIN — METOPROLOL SUCCINATE SCH MG: 25 TABLET, EXTENDED RELEASE ORAL at 21:45

## 2019-09-08 RX ADMIN — PRIMIDONE SCH MG: 50 TABLET ORAL at 08:37

## 2019-09-08 RX ADMIN — TAZOBACTAM SODIUM AND PIPERACILLIN SODIUM SCH MLS/HR: 375; 3 INJECTION, SOLUTION INTRAVENOUS at 12:37

## 2019-09-08 RX ADMIN — INSULIN LISPRO SCH UNIT: 100 INJECTION, SOLUTION INTRAVENOUS; SUBCUTANEOUS at 08:27

## 2019-09-08 RX ADMIN — AMLODIPINE BESYLATE SCH MG: 5 TABLET ORAL at 08:37

## 2019-09-08 RX ADMIN — INSULIN GLARGINE SCH UNITS: 100 INJECTION, SOLUTION SUBCUTANEOUS at 08:38

## 2019-09-08 RX ADMIN — COLLAGENASE SANTYL SCH GM: 250 OINTMENT TOPICAL at 08:37

## 2019-09-08 RX ADMIN — INSULIN LISPRO SCH UNIT: 100 INJECTION, SOLUTION INTRAVENOUS; SUBCUTANEOUS at 21:56

## 2019-09-08 RX ADMIN — Medication SCH MG: at 21:45

## 2019-09-08 RX ADMIN — PRIMIDONE SCH MG: 50 TABLET ORAL at 17:34

## 2019-09-09 VITALS — DIASTOLIC BLOOD PRESSURE: 83 MMHG | SYSTOLIC BLOOD PRESSURE: 144 MMHG

## 2019-09-09 VITALS — SYSTOLIC BLOOD PRESSURE: 120 MMHG | DIASTOLIC BLOOD PRESSURE: 76 MMHG

## 2019-09-09 VITALS — DIASTOLIC BLOOD PRESSURE: 78 MMHG | SYSTOLIC BLOOD PRESSURE: 120 MMHG

## 2019-09-09 VITALS — DIASTOLIC BLOOD PRESSURE: 74 MMHG | SYSTOLIC BLOOD PRESSURE: 124 MMHG

## 2019-09-09 VITALS — SYSTOLIC BLOOD PRESSURE: 125 MMHG | DIASTOLIC BLOOD PRESSURE: 71 MMHG

## 2019-09-09 VITALS — SYSTOLIC BLOOD PRESSURE: 120 MMHG | DIASTOLIC BLOOD PRESSURE: 78 MMHG

## 2019-09-09 VITALS — DIASTOLIC BLOOD PRESSURE: 75 MMHG | SYSTOLIC BLOOD PRESSURE: 125 MMHG

## 2019-09-09 VITALS — DIASTOLIC BLOOD PRESSURE: 81 MMHG | SYSTOLIC BLOOD PRESSURE: 136 MMHG

## 2019-09-09 RX ADMIN — VANCOMYCIN HYDROCHLORIDE SCH MLS/HR: 1 INJECTION, SOLUTION INTRAVENOUS at 21:22

## 2019-09-09 RX ADMIN — ENOXAPARIN SODIUM SCH MG: 40 INJECTION SUBCUTANEOUS at 17:03

## 2019-09-09 RX ADMIN — TAZOBACTAM SODIUM AND PIPERACILLIN SODIUM SCH MLS/HR: 375; 3 INJECTION, SOLUTION INTRAVENOUS at 18:20

## 2019-09-09 RX ADMIN — TAZOBACTAM SODIUM AND PIPERACILLIN SODIUM SCH MLS/HR: 375; 3 INJECTION, SOLUTION INTRAVENOUS at 13:22

## 2019-09-09 RX ADMIN — INSULIN LISPRO SCH UNIT: 100 INJECTION, SOLUTION INTRAVENOUS; SUBCUTANEOUS at 16:30

## 2019-09-09 RX ADMIN — PRIMIDONE SCH MG: 50 TABLET ORAL at 17:03

## 2019-09-09 RX ADMIN — TAZOBACTAM SODIUM AND PIPERACILLIN SODIUM SCH MLS/HR: 375; 3 INJECTION, SOLUTION INTRAVENOUS at 23:07

## 2019-09-09 RX ADMIN — TAZOBACTAM SODIUM AND PIPERACILLIN SODIUM SCH MLS/HR: 375; 3 INJECTION, SOLUTION INTRAVENOUS at 05:51

## 2019-09-09 RX ADMIN — Medication SCH MG: at 20:24

## 2019-09-09 RX ADMIN — INSULIN LISPRO SCH UNIT: 100 INJECTION, SOLUTION INTRAVENOUS; SUBCUTANEOUS at 21:23

## 2019-09-09 RX ADMIN — AMLODIPINE BESYLATE SCH MG: 5 TABLET ORAL at 09:03

## 2019-09-09 RX ADMIN — INSULIN LISPRO SCH UNIT: 100 INJECTION, SOLUTION INTRAVENOUS; SUBCUTANEOUS at 11:30

## 2019-09-09 RX ADMIN — PRIMIDONE SCH MG: 50 TABLET ORAL at 09:03

## 2019-09-09 RX ADMIN — INSULIN LISPRO SCH UNIT: 100 INJECTION, SOLUTION INTRAVENOUS; SUBCUTANEOUS at 07:30

## 2019-09-09 RX ADMIN — METOPROLOL SUCCINATE SCH MG: 25 TABLET, EXTENDED RELEASE ORAL at 20:25

## 2019-09-09 RX ADMIN — COLLAGENASE SANTYL SCH GM: 250 OINTMENT TOPICAL at 10:45

## 2019-09-09 RX ADMIN — INSULIN GLARGINE SCH UNITS: 100 INJECTION, SOLUTION SUBCUTANEOUS at 07:30

## 2019-09-09 NOTE — PROGRESS NOTE
DATE:  09/09/2019  

 

SUBJECTIVE:  The patient at bedside, doing better.  Decreased discomfort to the right

lower extremity.  Denies any history of fever, chills, nausea, or vomiting. 

 

OBJECTIVE:  VITAL SIGNS:  Afebrile, pulse rate 74, respirations 18, blood pressure

120/70, and O2 saturation 96%. 

EXTREMITIES:  Ulceration to the right lower extremity is looking somewhat better.  Some

granulation tissue noted.  Minimal foul smell.  Some tendon exposed, measuring more than

3 to 3.5 cm in diameter.  No bone exposed.  There is decreased cellulitis to the dorsal

aspect right foot.  Pedal pulses diminished. 

 

ASSESSMENT:  Possible osteomyelitis 5th metatarsal head with a grade 3/4 ulceration

right foot. 

 

PLAN:  We will continue local wound care with diluted wet-to-dry Betadine and Santyl

collagenase.  Continue offloading.  We will continue to monitor foot.  If not

responsive, may need a bone debridement with possible partial amputation. 

 

 

 

______________________________

NYA Chavez/ELISE

D:  09/09/2019 08:45:37

T:  09/09/2019 13:23:15

Job #:  [id number]

## 2019-09-09 NOTE — DIAGNOSTIC IMAGING REPORT
MRI of the right foot without contrast.



History: Diabetic foot ulcer. Ulcer under the fifth digit.



Technique: Multiplanar multisequence MRI of the foot without contrast

 

Comparison:  Radiographs 9/6/2019



Findings: 



Apparent postsurgical change at the plantar aspect of the first toe with

associated skin thickening and mild bone marrow edema in the distal tuft of the

first toe. This could be stress related/postsurgical or possibly osteomyelitis

in the appropriate clinical context.



Skin ulceration with skin thickening and abnormal soft tissue edema at the

plantar aspect of the distal fifth metatarsal and proximal fifth toe. There is

abnormal bone marrow edema in the distal fifth metatarsal and proximal fifth

toe worrisome for osteomyelitis. No well-formed drainable fluid

collection/abscess is seen.



No acute fracture, dislocation or evidence of avascular necrosis.



Scattered degenerative changes are seen.



Mild diffuse muscle atrophy.



No ligamentous or tendon tear is seen.



The visualized neurovascular bundles are intact.



Diffuse edema about the foot could be due to cellulitis.



Impression:

Skin ulceration with skin thickening and abnormal soft tissue edema at the

plantar aspect of the distal fifth metatarsal and proximal fifth toe. There is

abnormal bone marrow edema in the distal fifth metatarsal and proximal fifth

toe worrisome for osteomyelitis. No well-formed drainable fluid

collection/abscess is seen.



Apparent postsurgical change/prior osteomyelitis at the plantar aspect of the

first toe with associated skin thickening and mild bone marrow edema in the

distal tuft of the first toe. This could be stress related/postsurgical or

possibly osteomyelitis in the appropriate clinical context





 Preliminary report given by Dr. Keita at the time of the study:



An ulcer is at the base of the 5th digit at the MTP.  There is edema of the

head of the 5th metatarsal concerning for osteomyelitis.  



Diffuse edema of the forefoot.  



Truncated distal phalanx of the first digit, the previous site of

osteomyelitis.



Signed by: Dr. Zach Ferreira M.D. on 9/9/2019 8:27 AM

## 2019-09-09 NOTE — PROGRESS NOTE
DATE:  09/08/2019  

 

SUBJECTIVE:  The patient is seen at bedside, accompanied by wife and family members.

Denying any history of fever, chills, nausea, vomiting, having some discomfort to the

right foot. 

 

OBJECTIVE:  VITAL SIGNS:  Afebrile, pulse rate 83, respirations 20, blood pressure

121/73, O2 saturation at 97%. 

 

Ulceration to the 5th metatarsophalangeal joint plantarly is approximately 3 to 3.5 cm

in diameter.  There is some necrosis, very close to bone down the tendon.  Foul smell

present with cellulitis up to mid foot to the dorsal aspect. 

 

ASSESSMENT:  Grade 3 ulcer with possible osteo 5th metatarsal head, left foot.  Plan

with decreased circulatory status. 

 

PLAN:  Sharp excisional debridement of the ulcer was performed down to tendon.

Devitalized tissue sharply excised via the use of a sterile #10 blade until good viable

bleeding tissue was achieved.  Deep cultures were taken for aerobic and anaerobic

growth. Foul smell present after debridement.  The ulcer measured approximately 3 to 3.5

cm diameter after the debridement was performed to the grade 3/4 ulceration right foot.

We will continue IV antibiotics.  Local wound care with Santyl followed by diluted

wet-to-dry Betadine.  Continue to monitor.  The patient 

understands no guarantees or warranties can be given.  The patient may need further

debridement, which may include a possible partial amputation if not responsive. 

 

 

 

 

______________________________

NYA Chavez/ELISE

D:  09/08/2019 17:24:07

T:  09/09/2019 01:18:09

Job #:  [id number]

## 2019-09-10 VITALS — SYSTOLIC BLOOD PRESSURE: 161 MMHG | DIASTOLIC BLOOD PRESSURE: 85 MMHG

## 2019-09-10 VITALS — SYSTOLIC BLOOD PRESSURE: 149 MMHG | DIASTOLIC BLOOD PRESSURE: 89 MMHG

## 2019-09-10 VITALS — DIASTOLIC BLOOD PRESSURE: 85 MMHG | SYSTOLIC BLOOD PRESSURE: 141 MMHG

## 2019-09-10 VITALS — SYSTOLIC BLOOD PRESSURE: 141 MMHG | DIASTOLIC BLOOD PRESSURE: 85 MMHG

## 2019-09-10 VITALS — SYSTOLIC BLOOD PRESSURE: 162 MMHG | DIASTOLIC BLOOD PRESSURE: 85 MMHG

## 2019-09-10 VITALS — DIASTOLIC BLOOD PRESSURE: 91 MMHG | SYSTOLIC BLOOD PRESSURE: 142 MMHG

## 2019-09-10 RX ADMIN — INSULIN LISPRO SCH UNIT: 100 INJECTION, SOLUTION INTRAVENOUS; SUBCUTANEOUS at 17:00

## 2019-09-10 RX ADMIN — INSULIN LISPRO SCH UNIT: 100 INJECTION, SOLUTION INTRAVENOUS; SUBCUTANEOUS at 12:25

## 2019-09-10 RX ADMIN — TAZOBACTAM SODIUM AND PIPERACILLIN SODIUM SCH MLS/HR: 375; 3 INJECTION, SOLUTION INTRAVENOUS at 12:25

## 2019-09-10 RX ADMIN — COLLAGENASE SANTYL SCH GM: 250 OINTMENT TOPICAL at 09:30

## 2019-09-10 RX ADMIN — INSULIN LISPRO SCH UNIT: 100 INJECTION, SOLUTION INTRAVENOUS; SUBCUTANEOUS at 21:37

## 2019-09-10 RX ADMIN — TAZOBACTAM SODIUM AND PIPERACILLIN SODIUM SCH MLS/HR: 375; 3 INJECTION, SOLUTION INTRAVENOUS at 05:17

## 2019-09-10 RX ADMIN — Medication SCH MG: at 21:43

## 2019-09-10 RX ADMIN — AMLODIPINE BESYLATE SCH MG: 5 TABLET ORAL at 09:13

## 2019-09-10 RX ADMIN — ENOXAPARIN SODIUM SCH MG: 40 INJECTION SUBCUTANEOUS at 17:00

## 2019-09-10 RX ADMIN — METOPROLOL SUCCINATE SCH MG: 25 TABLET, EXTENDED RELEASE ORAL at 21:44

## 2019-09-10 RX ADMIN — INSULIN GLARGINE SCH UNITS: 100 INJECTION, SOLUTION SUBCUTANEOUS at 08:00

## 2019-09-10 RX ADMIN — PRIMIDONE SCH MG: 50 TABLET ORAL at 17:00

## 2019-09-10 RX ADMIN — VANCOMYCIN HYDROCHLORIDE SCH MLS/HR: 1 INJECTION, SOLUTION INTRAVENOUS at 21:44

## 2019-09-10 RX ADMIN — INSULIN LISPRO SCH UNIT: 100 INJECTION, SOLUTION INTRAVENOUS; SUBCUTANEOUS at 08:00

## 2019-09-10 RX ADMIN — PRIMIDONE SCH MG: 50 TABLET ORAL at 09:13

## 2019-09-10 RX ADMIN — TAZOBACTAM SODIUM AND PIPERACILLIN SODIUM SCH MLS/HR: 375; 3 INJECTION, SOLUTION INTRAVENOUS at 17:00

## 2019-09-10 NOTE — PROGRESS NOTE
DATE:  09/10/2019  

 

SUBJECTIVE:  The patient at bedside, doing better.  Denies any history of fever, chills,

nausea, or vomiting.  Decreased swelling to the right lower extremity. 

 

OBJECTIVE:  VITAL SIGNS:  Afebrile, pulse rate 73, respirations 20, blood pressure

149/89, and O2 saturation 96%. 

EXTREMITIES:  Pedal pulses diminished to both the DP and PT.  There is still some

cellulitis to the mid foot aspect of the right foot, but getting better.  Still some

foul smell to the right foot ulceration, getting a little bit better.  Some granulation

tissue noted down to capsular structures. 

 

ASSESSMENT:  Grade 3 ulcer with possible osteo 5th metatarsal right foot with diabetic

neuropathy and peripheral arterial disease. 

 

PLAN:  We will continue Santyl followed by diluted wet-to-dry Betadine.  Continue IV

antibiotics such as vancomycin and Zosyn.  We will continue to let the foot demarcate.

The patient may need further debridement. 

 

Ulceration is approximately 3.5 cm in diameter.

 

 

 

 

______________________________

NYA Chavez/ELISE

D:  09/10/2019 07:18:35

T:  09/10/2019 13:18:01

Job #:  [id number]

## 2019-09-11 VITALS — SYSTOLIC BLOOD PRESSURE: 132 MMHG | DIASTOLIC BLOOD PRESSURE: 76 MMHG

## 2019-09-11 VITALS — DIASTOLIC BLOOD PRESSURE: 76 MMHG | SYSTOLIC BLOOD PRESSURE: 132 MMHG

## 2019-09-11 VITALS — SYSTOLIC BLOOD PRESSURE: 155 MMHG | DIASTOLIC BLOOD PRESSURE: 74 MMHG

## 2019-09-11 VITALS — SYSTOLIC BLOOD PRESSURE: 137 MMHG | DIASTOLIC BLOOD PRESSURE: 80 MMHG

## 2019-09-11 VITALS — DIASTOLIC BLOOD PRESSURE: 83 MMHG | SYSTOLIC BLOOD PRESSURE: 143 MMHG

## 2019-09-11 VITALS — SYSTOLIC BLOOD PRESSURE: 137 MMHG | DIASTOLIC BLOOD PRESSURE: 72 MMHG

## 2019-09-11 VITALS — SYSTOLIC BLOOD PRESSURE: 144 MMHG | DIASTOLIC BLOOD PRESSURE: 11 MMHG

## 2019-09-11 RX ADMIN — Medication SCH MG: at 21:31

## 2019-09-11 RX ADMIN — TAZOBACTAM SODIUM AND PIPERACILLIN SODIUM SCH MLS/HR: 375; 3 INJECTION, SOLUTION INTRAVENOUS at 11:50

## 2019-09-11 RX ADMIN — INSULIN GLARGINE SCH UNITS: 100 INJECTION, SOLUTION SUBCUTANEOUS at 08:15

## 2019-09-11 RX ADMIN — METOPROLOL SUCCINATE SCH MG: 25 TABLET, EXTENDED RELEASE ORAL at 21:31

## 2019-09-11 RX ADMIN — RIVAROXABAN SCH MG: 10 TABLET, FILM COATED ORAL at 17:28

## 2019-09-11 RX ADMIN — AMLODIPINE BESYLATE SCH MG: 5 TABLET ORAL at 09:13

## 2019-09-11 RX ADMIN — VANCOMYCIN HYDROCHLORIDE SCH MLS/HR: 1 INJECTION, SOLUTION INTRAVENOUS at 21:35

## 2019-09-11 RX ADMIN — INSULIN LISPRO SCH UNIT: 100 INJECTION, SOLUTION INTRAVENOUS; SUBCUTANEOUS at 17:15

## 2019-09-11 RX ADMIN — TAZOBACTAM SODIUM AND PIPERACILLIN SODIUM SCH MLS/HR: 375; 3 INJECTION, SOLUTION INTRAVENOUS at 06:18

## 2019-09-11 RX ADMIN — INSULIN LISPRO SCH UNIT: 100 INJECTION, SOLUTION INTRAVENOUS; SUBCUTANEOUS at 21:32

## 2019-09-11 RX ADMIN — INSULIN LISPRO SCH UNIT: 100 INJECTION, SOLUTION INTRAVENOUS; SUBCUTANEOUS at 08:15

## 2019-09-11 RX ADMIN — INSULIN LISPRO SCH UNIT: 100 INJECTION, SOLUTION INTRAVENOUS; SUBCUTANEOUS at 12:15

## 2019-09-11 RX ADMIN — TAZOBACTAM SODIUM AND PIPERACILLIN SODIUM SCH MLS/HR: 375; 3 INJECTION, SOLUTION INTRAVENOUS at 17:28

## 2019-09-11 RX ADMIN — TAZOBACTAM SODIUM AND PIPERACILLIN SODIUM SCH MLS/HR: 375; 3 INJECTION, SOLUTION INTRAVENOUS at 00:28

## 2019-09-11 RX ADMIN — PRIMIDONE SCH MG: 50 TABLET ORAL at 17:28

## 2019-09-11 RX ADMIN — PRIMIDONE SCH MG: 50 TABLET ORAL at 09:13

## 2019-09-11 RX ADMIN — COLLAGENASE SANTYL SCH GM: 250 OINTMENT TOPICAL at 09:14

## 2019-09-12 VITALS — DIASTOLIC BLOOD PRESSURE: 80 MMHG | SYSTOLIC BLOOD PRESSURE: 137 MMHG

## 2019-09-12 VITALS — SYSTOLIC BLOOD PRESSURE: 133 MMHG | DIASTOLIC BLOOD PRESSURE: 73 MMHG

## 2019-09-12 VITALS — DIASTOLIC BLOOD PRESSURE: 77 MMHG | SYSTOLIC BLOOD PRESSURE: 122 MMHG

## 2019-09-12 VITALS — DIASTOLIC BLOOD PRESSURE: 70 MMHG | SYSTOLIC BLOOD PRESSURE: 142 MMHG

## 2019-09-12 VITALS — DIASTOLIC BLOOD PRESSURE: 63 MMHG | SYSTOLIC BLOOD PRESSURE: 107 MMHG

## 2019-09-12 VITALS — SYSTOLIC BLOOD PRESSURE: 147 MMHG | DIASTOLIC BLOOD PRESSURE: 82 MMHG

## 2019-09-12 VITALS — DIASTOLIC BLOOD PRESSURE: 82 MMHG | SYSTOLIC BLOOD PRESSURE: 147 MMHG

## 2019-09-12 VITALS — DIASTOLIC BLOOD PRESSURE: 68 MMHG | SYSTOLIC BLOOD PRESSURE: 115 MMHG

## 2019-09-12 VITALS — SYSTOLIC BLOOD PRESSURE: 122 MMHG | DIASTOLIC BLOOD PRESSURE: 77 MMHG

## 2019-09-12 LAB
ANION GAP SERPL CALC-SCNC: 10.1 MMOL/L (ref 8–16)
BASOPHILS # BLD AUTO: 0 10*3/UL (ref 0–0.1)
BASOPHILS NFR BLD AUTO: 0.6 % (ref 0–1)
BUN SERPL-MCNC: 16 MG/DL (ref 7–26)
BUN/CREAT SERPL: 15 (ref 6–25)
CALCIUM SERPL-MCNC: 9.3 MG/DL (ref 8.4–10.2)
CHLORIDE SERPL-SCNC: 101 MMOL/L (ref 98–107)
CO2 SERPL-SCNC: 30 MMOL/L (ref 22–29)
DEPRECATED NEUTROPHILS # BLD AUTO: 4.5 10*3/UL (ref 2.1–6.9)
EGFRCR SERPLBLD CKD-EPI 2021: > 60 ML/MIN (ref 60–?)
EOSINOPHIL # BLD AUTO: 0.3 10*3/UL (ref 0–0.4)
EOSINOPHIL NFR BLD AUTO: 4.6 % (ref 0–6)
ERYTHROCYTE [DISTWIDTH] IN CORD BLOOD: 12.1 % (ref 11.7–14.4)
GLUCOSE SERPLBLD-MCNC: 199 MG/DL (ref 74–118)
HCT VFR BLD AUTO: 39.2 % (ref 38.2–49.6)
HGB BLD-MCNC: 13 G/DL (ref 14–18)
LYMPHOCYTES # BLD: 1.1 10*3/UL (ref 1–3.2)
LYMPHOCYTES NFR BLD AUTO: 16.3 % (ref 18–39.1)
MCH RBC QN AUTO: 29.6 PG (ref 28–32)
MCHC RBC AUTO-ENTMCNC: 33.2 G/DL (ref 31–35)
MCV RBC AUTO: 89.3 FL (ref 81–99)
MONOCYTES # BLD AUTO: 0.8 10*3/UL (ref 0.2–0.8)
MONOCYTES NFR BLD AUTO: 11.2 % (ref 4.4–11.3)
NEUTS SEG NFR BLD AUTO: 67 % (ref 38.7–80)
PLATELET # BLD AUTO: 221 X10E3/UL (ref 140–360)
POTASSIUM SERPL-SCNC: 4.1 MMOL/L (ref 3.5–5.1)
RBC # BLD AUTO: 4.39 X10E6/UL (ref 4.3–5.7)
SODIUM SERPL-SCNC: 137 MMOL/L (ref 136–145)

## 2019-09-12 RX ADMIN — PRIMIDONE SCH MG: 50 TABLET ORAL at 08:05

## 2019-09-12 RX ADMIN — PRIMIDONE SCH MG: 50 TABLET ORAL at 18:16

## 2019-09-12 RX ADMIN — Medication SCH MG: at 21:56

## 2019-09-12 RX ADMIN — COLLAGENASE SANTYL SCH GM: 250 OINTMENT TOPICAL at 08:06

## 2019-09-12 RX ADMIN — RIVAROXABAN SCH MG: 10 TABLET, FILM COATED ORAL at 18:16

## 2019-09-12 RX ADMIN — INSULIN LISPRO SCH UNIT: 100 INJECTION, SOLUTION INTRAVENOUS; SUBCUTANEOUS at 21:00

## 2019-09-12 RX ADMIN — INSULIN GLARGINE SCH UNITS: 100 INJECTION, SOLUTION SUBCUTANEOUS at 08:15

## 2019-09-12 RX ADMIN — INSULIN LISPRO SCH UNIT: 100 INJECTION, SOLUTION INTRAVENOUS; SUBCUTANEOUS at 08:15

## 2019-09-12 RX ADMIN — TAZOBACTAM SODIUM AND PIPERACILLIN SODIUM SCH MLS/HR: 375; 3 INJECTION, SOLUTION INTRAVENOUS at 12:09

## 2019-09-12 RX ADMIN — VANCOMYCIN HYDROCHLORIDE SCH MLS/HR: 1 INJECTION, SOLUTION INTRAVENOUS at 21:56

## 2019-09-12 RX ADMIN — TAZOBACTAM SODIUM AND PIPERACILLIN SODIUM SCH MLS/HR: 375; 3 INJECTION, SOLUTION INTRAVENOUS at 18:16

## 2019-09-12 RX ADMIN — INSULIN LISPRO SCH UNIT: 100 INJECTION, SOLUTION INTRAVENOUS; SUBCUTANEOUS at 17:30

## 2019-09-12 RX ADMIN — INSULIN LISPRO SCH UNIT: 100 INJECTION, SOLUTION INTRAVENOUS; SUBCUTANEOUS at 12:15

## 2019-09-12 RX ADMIN — AMLODIPINE BESYLATE SCH MG: 5 TABLET ORAL at 08:05

## 2019-09-12 RX ADMIN — RIVAROXABAN SCH MG: 10 TABLET, FILM COATED ORAL at 08:05

## 2019-09-12 RX ADMIN — TAZOBACTAM SODIUM AND PIPERACILLIN SODIUM SCH MLS/HR: 375; 3 INJECTION, SOLUTION INTRAVENOUS at 05:25

## 2019-09-12 RX ADMIN — METOPROLOL SUCCINATE SCH MG: 25 TABLET, EXTENDED RELEASE ORAL at 21:56

## 2019-09-12 RX ADMIN — TAZOBACTAM SODIUM AND PIPERACILLIN SODIUM SCH MLS/HR: 375; 3 INJECTION, SOLUTION INTRAVENOUS at 00:28

## 2019-09-12 NOTE — DIAGNOSTIC IMAGING REPORT
SACRUM/COCCYX X-RAY - 5 VIEWS    



HISTORY:       

^FALL / XRAY

^[id number]

^1950    

COMPARISON: None available.

     

FINDINGS:

Bones:

No acute displaced fracture.  

Osseous alignment is within normal limits.



Joints:

Mild degenerative changes of the lower lumbar spine and sacroiliac joints.



Soft tissues:

The soft tissues appear unremarkable.





IMPRESSION: 

No acute radiographic abnormality.



Signed by: Dr. Michelle Peacock M.D. on 9/12/2019 8:16 PM

## 2019-09-12 NOTE — NUR
Nutrition Follow-up Note



RD Recommendation for Physician: Continue diet as ordered 



Plan of Care: RD following, monitoring for tolerance and adequacy



Nutrition reason for involvement:

Follow up 



Primary Diagnose(s): diabetes foot ulcer



PMH: Hypothyroid, T2DM, HTN



Ht:69 in 

Wt:184.5lb     

BMI:27.2 kg/m2

IBW:lb +/-10%



RD Assessment:

(9/12) Visited pt in the room. Pt reported good appetite with 100% lunch intake. No GI 
complains noted. Current diet is appropriate and adequate. No other concern at this time. 



(9/7/2019) Chart reviewed. Labs and meds reviewed. Initial encounter with patient. Pt Denies 
any known food allergies, Nausea, Vomiting, or Diarrhea. Pt denies any difficulty chewing or 
swallowing. Eating well PTA.



Current Diet: 1800 ADA



Malnutrition Evaluation (9/7/2019)

The patient does not meet criteria for a specified degree of malnutrition at this time. Will 
re-evaluate at follow-up as appropriate. 



Diet Education Needs Assessment:

Diet education not desired

     

Nutrition Care Level: Low



Signed: Tiffanie Valverde, MS, RD, LD

## 2019-09-12 NOTE — NUR
PT WIFE WANTS TO ADD HER HOME MED LEVOTHYROXINE. PAGED DR. STARR AND LEFT MESSAGE REGARDING 
THE SAME.

## 2019-09-12 NOTE — PROGRESS NOTE
DATE:  09/12/2019  

 

SUBJECTIVE:  The patient is seen at bedside, doing somewhat better.  Decreased

cellulitis of the right lower extremity.  Denies any history of fever, chills, nausea,

or vomiting. 

 

OBJECTIVE:  VITAL SIGNS:  Afebrile, pulse rate 66, respirations 18, blood pressure

137/80, and O2 saturation 97%. 

EXTREMITIES:  Ulceration to the sub 5th metatarsophalangeal joint still shows some foul

smell.  Some granulation tissue noted with fibrosis.  No bone exposed, very close to the

joint capsule.  Cellulitis of the dorsal aspect of the right lower extremity also seems

to be resolving. 

 

ASSESSMENT:  Grade 3 ulceration, cellulitis with diabetic neuropathy.

 

PLAN:  We will continue IV antibiotics.  Continue local wound care.  Ulceration will be

debrided tomorrow at bedside. 

 

 

 

 

______________________________

NYA Chavez/ELISE

D:  09/12/2019 07:18:24

T:  09/12/2019 13:25:10

Job #:  [id number]

## 2019-09-12 NOTE — NUR
WOUND DRESSING COMPLETED FOR THE RIGHT FOOT. PT DENIED TO WEAR YELLOW SOCKS. EDUCATED PT 
ABOUT FALL PRECAUTIONS. BED ALARM IS ON. CALL LIGHT WITH IN EASY REACH. INSTRUCTED PT TO 
CALL FOR ANY NEEDS. PT VERBALIZED UNDERSTANDING.

## 2019-09-12 NOTE — NUR
DR. STARR AT BEDSIDE. ASSESSED PT. NEW ORDER FOR XRAY. PT DENIED FURTHER NEEDS. PT WIFE 
PRESENT AT THE BEDSIDE

## 2019-09-12 NOTE — NUR
PATIENT SLID FROM THE CHAIR WHILE ATTEMPTING TO PICK CELL PHONE FROM THE TABLE. THIS NURSE 
SAW WHILE ENTERING IN TO THE ROOM. RN ASSESSED PT AND NO BRUISE OR SKIN BREAKDOWN NOTED 
ANYWHERE ON THE BODY. PT DENIES NEEDS AT THIS TIME.

## 2019-09-13 VITALS — DIASTOLIC BLOOD PRESSURE: 75 MMHG | SYSTOLIC BLOOD PRESSURE: 121 MMHG

## 2019-09-13 VITALS — DIASTOLIC BLOOD PRESSURE: 76 MMHG | SYSTOLIC BLOOD PRESSURE: 130 MMHG

## 2019-09-13 VITALS — DIASTOLIC BLOOD PRESSURE: 63 MMHG | SYSTOLIC BLOOD PRESSURE: 102 MMHG

## 2019-09-13 VITALS — SYSTOLIC BLOOD PRESSURE: 133 MMHG | DIASTOLIC BLOOD PRESSURE: 80 MMHG

## 2019-09-13 VITALS — DIASTOLIC BLOOD PRESSURE: 72 MMHG | SYSTOLIC BLOOD PRESSURE: 114 MMHG

## 2019-09-13 VITALS — SYSTOLIC BLOOD PRESSURE: 142 MMHG | DIASTOLIC BLOOD PRESSURE: 68 MMHG

## 2019-09-13 VITALS — DIASTOLIC BLOOD PRESSURE: 68 MMHG | SYSTOLIC BLOOD PRESSURE: 142 MMHG

## 2019-09-13 VITALS — DIASTOLIC BLOOD PRESSURE: 80 MMHG | SYSTOLIC BLOOD PRESSURE: 133 MMHG

## 2019-09-13 PROCEDURE — 0JBQ0ZZ EXCISION OF RIGHT FOOT SUBCUTANEOUS TISSUE AND FASCIA, OPEN APPROACH: ICD-10-PCS | Performed by: PODIATRIST

## 2019-09-13 RX ADMIN — INSULIN LISPRO SCH UNIT: 100 INJECTION, SOLUTION INTRAVENOUS; SUBCUTANEOUS at 11:30

## 2019-09-13 RX ADMIN — VANCOMYCIN HYDROCHLORIDE SCH MLS/HR: 1 INJECTION, SOLUTION INTRAVENOUS at 22:04

## 2019-09-13 RX ADMIN — PRIMIDONE SCH MG: 50 TABLET ORAL at 08:51

## 2019-09-13 RX ADMIN — Medication SCH MG: at 22:02

## 2019-09-13 RX ADMIN — INSULIN GLARGINE SCH UNITS: 100 INJECTION, SOLUTION SUBCUTANEOUS at 07:30

## 2019-09-13 RX ADMIN — INSULIN LISPRO SCH UNIT: 100 INJECTION, SOLUTION INTRAVENOUS; SUBCUTANEOUS at 17:19

## 2019-09-13 RX ADMIN — TAZOBACTAM SODIUM AND PIPERACILLIN SODIUM SCH MLS/HR: 375; 3 INJECTION, SOLUTION INTRAVENOUS at 05:22

## 2019-09-13 RX ADMIN — INSULIN LISPRO SCH UNIT: 100 INJECTION, SOLUTION INTRAVENOUS; SUBCUTANEOUS at 22:04

## 2019-09-13 RX ADMIN — INSULIN LISPRO SCH UNIT: 100 INJECTION, SOLUTION INTRAVENOUS; SUBCUTANEOUS at 07:30

## 2019-09-13 RX ADMIN — PRIMIDONE SCH MG: 50 TABLET ORAL at 16:43

## 2019-09-13 RX ADMIN — RIVAROXABAN SCH MG: 10 TABLET, FILM COATED ORAL at 08:51

## 2019-09-13 RX ADMIN — METOPROLOL SUCCINATE SCH MG: 25 TABLET, EXTENDED RELEASE ORAL at 22:04

## 2019-09-13 RX ADMIN — TAZOBACTAM SODIUM AND PIPERACILLIN SODIUM SCH MLS/HR: 375; 3 INJECTION, SOLUTION INTRAVENOUS at 00:30

## 2019-09-13 RX ADMIN — COLLAGENASE SANTYL SCH GM: 250 OINTMENT TOPICAL at 09:00

## 2019-09-13 RX ADMIN — AMLODIPINE BESYLATE SCH MG: 5 TABLET ORAL at 08:51

## 2019-09-13 RX ADMIN — RIVAROXABAN SCH MG: 10 TABLET, FILM COATED ORAL at 16:43

## 2019-09-13 RX ADMIN — TAZOBACTAM SODIUM AND PIPERACILLIN SODIUM SCH MLS/HR: 375; 3 INJECTION, SOLUTION INTRAVENOUS at 12:46

## 2019-09-13 RX ADMIN — METFORMIN HYDROCHLORIDE SCH MG: 500 TABLET, FILM COATED ORAL at 16:43

## 2019-09-13 RX ADMIN — TAZOBACTAM SODIUM AND PIPERACILLIN SODIUM SCH MLS/HR: 375; 3 INJECTION, SOLUTION INTRAVENOUS at 16:43

## 2019-09-13 NOTE — NUR
Report given to oncoming nurse of patient's status. Resting in bed. No s/s of acute distress 
noted. Side rails upx2, call light within reach.

## 2019-09-13 NOTE — PROGRESS NOTE
DATE:  09/13/2019  

 

SUBJECTIVE:  The patient at bedside, doing somewhat better.  Still has some redness and

swelling to the right lower extremity, but denying any history of fever, chills, nausea,

or vomiting. 

 

OBJECTIVE:  VITAL SIGNS:  Afebrile, pulse rate 69, respirations 22, blood pressure

121/75, O2 saturation 98%. 

EXTREMITIES:  Pedal pulses diminished to both the DP and PT.  Positive cellulitis to the

dorsal aspect right foot, getting significantly better.  Has a grade 3 ulceration down

to the joint capsule with no bone exposed to the right 5th metatarsophalangeal joint,

some fibrosis and foul smell, but getting better, measuring 2 to 2.5 cm in diameter. 

 

ASSESSMENT:  Grade 3 ulceration with necrosis noted down the joint capsule with

cellulitis, diabetic neuropathy, and peripheral arterial disease. 

 

PLAN:  After discussing different options and under no anesthesia secondary to his

peripheral neuropathy, sharp excisional debridement of the ulcer was performed down to

capsule and tendinous structures.  Devitalized tissue sharply excised until good viable

bleeding tissue was achieved.  Sterile dressing was applied with Santyl followed by

diluted wet-to-dry Betadine.  We will continue IV antibiotics, local wound care.  The

patient is responding slowly to IV antibiotics 

and local wound care.  Instructed if not responsive, may need a partial amputation of

foot.  We will continue IV Zosyn and vancomycin, wound care and offloading. 

 

 

 

 

______________________________

NYA Chavez/ELISE

D:  09/13/2019 08:32:17

T:  09/13/2019 14:48:22

Job #:  [id number]

## 2019-09-14 VITALS — DIASTOLIC BLOOD PRESSURE: 64 MMHG | SYSTOLIC BLOOD PRESSURE: 122 MMHG

## 2019-09-14 VITALS — DIASTOLIC BLOOD PRESSURE: 76 MMHG | SYSTOLIC BLOOD PRESSURE: 157 MMHG

## 2019-09-14 VITALS — SYSTOLIC BLOOD PRESSURE: 98 MMHG | DIASTOLIC BLOOD PRESSURE: 53 MMHG

## 2019-09-14 VITALS — DIASTOLIC BLOOD PRESSURE: 71 MMHG | SYSTOLIC BLOOD PRESSURE: 157 MMHG

## 2019-09-14 VITALS — DIASTOLIC BLOOD PRESSURE: 74 MMHG | SYSTOLIC BLOOD PRESSURE: 151 MMHG

## 2019-09-14 VITALS — SYSTOLIC BLOOD PRESSURE: 129 MMHG | DIASTOLIC BLOOD PRESSURE: 65 MMHG

## 2019-09-14 VITALS — SYSTOLIC BLOOD PRESSURE: 157 MMHG | DIASTOLIC BLOOD PRESSURE: 71 MMHG

## 2019-09-14 VITALS — DIASTOLIC BLOOD PRESSURE: 65 MMHG | SYSTOLIC BLOOD PRESSURE: 135 MMHG

## 2019-09-14 RX ADMIN — INSULIN LISPRO SCH UNIT: 100 INJECTION, SOLUTION INTRAVENOUS; SUBCUTANEOUS at 21:15

## 2019-09-14 RX ADMIN — INSULIN LISPRO SCH UNIT: 100 INJECTION, SOLUTION INTRAVENOUS; SUBCUTANEOUS at 12:30

## 2019-09-14 RX ADMIN — LEVOTHYROXINE SODIUM SCH MCG: 50 TABLET ORAL at 05:26

## 2019-09-14 RX ADMIN — TAZOBACTAM SODIUM AND PIPERACILLIN SODIUM SCH MLS/HR: 375; 3 INJECTION, SOLUTION INTRAVENOUS at 17:30

## 2019-09-14 RX ADMIN — TAZOBACTAM SODIUM AND PIPERACILLIN SODIUM SCH MLS/HR: 375; 3 INJECTION, SOLUTION INTRAVENOUS at 23:32

## 2019-09-14 RX ADMIN — INSULIN GLARGINE SCH UNITS: 100 INJECTION, SOLUTION SUBCUTANEOUS at 08:30

## 2019-09-14 RX ADMIN — Medication SCH MG: at 21:17

## 2019-09-14 RX ADMIN — TAZOBACTAM SODIUM AND PIPERACILLIN SODIUM SCH MLS/HR: 375; 3 INJECTION, SOLUTION INTRAVENOUS at 00:10

## 2019-09-14 RX ADMIN — METFORMIN HYDROCHLORIDE SCH MG: 500 TABLET, FILM COATED ORAL at 08:45

## 2019-09-14 RX ADMIN — RIVAROXABAN SCH MG: 10 TABLET, FILM COATED ORAL at 17:30

## 2019-09-14 RX ADMIN — PRIMIDONE SCH MG: 50 TABLET ORAL at 08:45

## 2019-09-14 RX ADMIN — TAZOBACTAM SODIUM AND PIPERACILLIN SODIUM SCH MLS/HR: 375; 3 INJECTION, SOLUTION INTRAVENOUS at 12:17

## 2019-09-14 RX ADMIN — METFORMIN HYDROCHLORIDE SCH MG: 500 TABLET, FILM COATED ORAL at 17:30

## 2019-09-14 RX ADMIN — INSULIN LISPRO SCH UNIT: 100 INJECTION, SOLUTION INTRAVENOUS; SUBCUTANEOUS at 08:30

## 2019-09-14 RX ADMIN — AMLODIPINE BESYLATE SCH MG: 5 TABLET ORAL at 08:45

## 2019-09-14 RX ADMIN — INSULIN LISPRO SCH UNIT: 100 INJECTION, SOLUTION INTRAVENOUS; SUBCUTANEOUS at 12:00

## 2019-09-14 RX ADMIN — RIVAROXABAN SCH MG: 10 TABLET, FILM COATED ORAL at 08:45

## 2019-09-14 RX ADMIN — COLLAGENASE SANTYL SCH GM: 250 OINTMENT TOPICAL at 09:00

## 2019-09-14 RX ADMIN — VANCOMYCIN HYDROCHLORIDE SCH MLS/HR: 1 INJECTION, SOLUTION INTRAVENOUS at 21:17

## 2019-09-14 RX ADMIN — METOPROLOL SUCCINATE SCH MG: 25 TABLET, EXTENDED RELEASE ORAL at 21:17

## 2019-09-14 RX ADMIN — TAZOBACTAM SODIUM AND PIPERACILLIN SODIUM SCH MLS/HR: 375; 3 INJECTION, SOLUTION INTRAVENOUS at 05:26

## 2019-09-14 RX ADMIN — PRIMIDONE SCH MG: 50 TABLET ORAL at 17:30

## 2019-09-14 NOTE — PROGRESS NOTE
DATE:  09/14/2019  

 

SUBJECTIVE:  The patient is seen at bedside, doing significantly better.  Decreased pain

to the right lower extremity.  Denies any history of fever, chills, nausea, or vomiting. 

 

OBJECTIVE:  VITAL SIGNS:  Afebrile, pulse rate 71, respirations 18, blood pressure

151/74, and O2 saturation 97%. 

EXTREMITIES:  Has an ulceration to sub 5th metatarsophalangeal joint, getting better.

Decreased foul smell.  Granulation tissue noted down to the capsule.  No bone exposed.

Measuring 2.5 to 3 cm in diameter.  Pedal pulses diminished. 

 

LABORATORY DATA:  Labs noted, has a white blood cell count of 6.7.

 

ASSESSMENT:  Peripheral arterial disease, diabetic neuropathy with grade 3 ulcer,

possible osteo. 

 

PLAN:  We will continue local wound care.  Continue IV antibiotics.  Continue

offloading.  We will continue to follow.  The patient continues to improve.  The patient

may be going home possibly Monday or Tuesday. 

 

 

 

 

______________________________

NYA Chavez/ELISE

D:  09/14/2019 20:36:19

T:  09/14/2019 21:25:27

Job #:  [id number]

## 2019-09-15 VITALS — SYSTOLIC BLOOD PRESSURE: 142 MMHG | DIASTOLIC BLOOD PRESSURE: 85 MMHG

## 2019-09-15 VITALS — SYSTOLIC BLOOD PRESSURE: 150 MMHG | DIASTOLIC BLOOD PRESSURE: 78 MMHG

## 2019-09-15 VITALS — DIASTOLIC BLOOD PRESSURE: 76 MMHG | SYSTOLIC BLOOD PRESSURE: 139 MMHG

## 2019-09-15 VITALS — DIASTOLIC BLOOD PRESSURE: 73 MMHG | SYSTOLIC BLOOD PRESSURE: 154 MMHG

## 2019-09-15 VITALS — SYSTOLIC BLOOD PRESSURE: 146 MMHG | DIASTOLIC BLOOD PRESSURE: 84 MMHG

## 2019-09-15 VITALS — DIASTOLIC BLOOD PRESSURE: 67 MMHG | SYSTOLIC BLOOD PRESSURE: 132 MMHG

## 2019-09-15 RX ADMIN — AMLODIPINE BESYLATE SCH MG: 5 TABLET ORAL at 09:30

## 2019-09-15 RX ADMIN — TAZOBACTAM SODIUM AND PIPERACILLIN SODIUM SCH MLS/HR: 375; 3 INJECTION, SOLUTION INTRAVENOUS at 23:39

## 2019-09-15 RX ADMIN — INSULIN LISPRO SCH UNIT: 100 INJECTION, SOLUTION INTRAVENOUS; SUBCUTANEOUS at 21:40

## 2019-09-15 RX ADMIN — TAZOBACTAM SODIUM AND PIPERACILLIN SODIUM SCH MLS/HR: 375; 3 INJECTION, SOLUTION INTRAVENOUS at 05:38

## 2019-09-15 RX ADMIN — VANCOMYCIN HYDROCHLORIDE SCH MLS/HR: 1 INJECTION, SOLUTION INTRAVENOUS at 21:56

## 2019-09-15 RX ADMIN — RIVAROXABAN SCH MG: 10 TABLET, FILM COATED ORAL at 09:30

## 2019-09-15 RX ADMIN — INSULIN LISPRO SCH UNIT: 100 INJECTION, SOLUTION INTRAVENOUS; SUBCUTANEOUS at 12:00

## 2019-09-15 RX ADMIN — METFORMIN HYDROCHLORIDE SCH MG: 500 TABLET, FILM COATED ORAL at 09:30

## 2019-09-15 RX ADMIN — Medication SCH MG: at 21:56

## 2019-09-15 RX ADMIN — LEVOTHYROXINE SODIUM SCH MCG: 50 TABLET ORAL at 05:38

## 2019-09-15 RX ADMIN — INSULIN LISPRO SCH UNIT: 100 INJECTION, SOLUTION INTRAVENOUS; SUBCUTANEOUS at 08:00

## 2019-09-15 RX ADMIN — INSULIN LISPRO SCH UNIT: 100 INJECTION, SOLUTION INTRAVENOUS; SUBCUTANEOUS at 17:00

## 2019-09-15 RX ADMIN — TAZOBACTAM SODIUM AND PIPERACILLIN SODIUM SCH MLS/HR: 375; 3 INJECTION, SOLUTION INTRAVENOUS at 17:00

## 2019-09-15 RX ADMIN — PRIMIDONE SCH MG: 50 TABLET ORAL at 09:30

## 2019-09-15 RX ADMIN — COLLAGENASE SANTYL SCH GM: 250 OINTMENT TOPICAL at 17:30

## 2019-09-15 RX ADMIN — METOPROLOL SUCCINATE SCH MG: 25 TABLET, EXTENDED RELEASE ORAL at 21:56

## 2019-09-15 RX ADMIN — TAZOBACTAM SODIUM AND PIPERACILLIN SODIUM SCH MLS/HR: 375; 3 INJECTION, SOLUTION INTRAVENOUS at 11:58

## 2019-09-15 RX ADMIN — METFORMIN HYDROCHLORIDE SCH MG: 500 TABLET, FILM COATED ORAL at 17:00

## 2019-09-15 RX ADMIN — INSULIN GLARGINE SCH UNITS: 100 INJECTION, SOLUTION SUBCUTANEOUS at 08:00

## 2019-09-15 RX ADMIN — RIVAROXABAN SCH MG: 10 TABLET, FILM COATED ORAL at 17:00

## 2019-09-15 RX ADMIN — PRIMIDONE SCH MG: 50 TABLET ORAL at 17:00

## 2019-09-15 NOTE — CONSULTATION
DATE OF CONSULTATION:  09/15/2019  

 

SUBJECTIVE:  The patient is at bedside, doing better.  Denies any history of fever,

chills, nausea, or vomiting. 

 

OBJECTIVE:  VITAL SIGNS:  Afebrile, pulse rate 70, respirations 21, blood pressure

154/73, and O2 saturation 97%. 

EXTREMITIES:  Ulceration to the sub 5th metatarsophalangeal joints less than 2.5 cm in

diameter.  Some necrosis noted down in the subcutaneous tissue.  No bone exposed.  Some

fibrotic tissue noted with minimal foul smell.  Decreased cellulitis to the dorsal

aspect of right foot with pedal pulses diminished. 

 

LABORATORY DATA:  White blood cell count of 6.7.

 

ASSESSMENT:  Peripheral arterial disease, grade 3 ulcer healing with possible

osteomyelitis . 

 

PLAN:  We will continue IV antibiotics such as Zosyn and vancomycin.  We will continue

local wound care with Santyl followed by diluted wet-to-dry Betadine.  Continue

offloading. 

Continue followup.  The patient may be going home, possibly on Tuesday.  If he continues

to improve, we will do a bedside debridement tomorrow. 

 

 

 

 

______________________________

NYA Chavez/ELISE

D:  09/15/2019 17:24:41

T:  09/15/2019 18:48:29

Job #:  [id number]

## 2019-09-15 NOTE — NUR
Visit made by the Spiritual Care Department Pastoral Visitor, Sam Manrique.  Pt in 
shower/unavailable at this time. PV left card describing spiritual care services available 
in hospital.  





YULIA RAO



Spiritual Care Department

O: [phone]

Pager: [phone] ([phone])

## 2019-09-16 VITALS — DIASTOLIC BLOOD PRESSURE: 59 MMHG | SYSTOLIC BLOOD PRESSURE: 130 MMHG

## 2019-09-16 VITALS — SYSTOLIC BLOOD PRESSURE: 151 MMHG | DIASTOLIC BLOOD PRESSURE: 86 MMHG

## 2019-09-16 VITALS — SYSTOLIC BLOOD PRESSURE: 152 MMHG | DIASTOLIC BLOOD PRESSURE: 84 MMHG

## 2019-09-16 VITALS — DIASTOLIC BLOOD PRESSURE: 73 MMHG | SYSTOLIC BLOOD PRESSURE: 132 MMHG

## 2019-09-16 VITALS — DIASTOLIC BLOOD PRESSURE: 77 MMHG | SYSTOLIC BLOOD PRESSURE: 136 MMHG

## 2019-09-16 VITALS — SYSTOLIC BLOOD PRESSURE: 136 MMHG | DIASTOLIC BLOOD PRESSURE: 77 MMHG

## 2019-09-16 VITALS — DIASTOLIC BLOOD PRESSURE: 80 MMHG | SYSTOLIC BLOOD PRESSURE: 125 MMHG

## 2019-09-16 LAB
ANION GAP SERPL CALC-SCNC: 10.1 MMOL/L (ref 8–16)
BASOPHILS # BLD AUTO: 0 10*3/UL (ref 0–0.1)
BASOPHILS NFR BLD AUTO: 0.5 % (ref 0–1)
BUN SERPL-MCNC: 20 MG/DL (ref 7–26)
BUN/CREAT SERPL: 19 (ref 6–25)
CALCIUM SERPL-MCNC: 9.5 MG/DL (ref 8.4–10.2)
CHLORIDE SERPL-SCNC: 102 MMOL/L (ref 98–107)
CO2 SERPL-SCNC: 29 MMOL/L (ref 22–29)
DEPRECATED NEUTROPHILS # BLD AUTO: 5.2 10*3/UL (ref 2.1–6.9)
EGFRCR SERPLBLD CKD-EPI 2021: > 60 ML/MIN (ref 60–?)
EOSINOPHIL # BLD AUTO: 0.3 10*3/UL (ref 0–0.4)
EOSINOPHIL NFR BLD AUTO: 4.1 % (ref 0–6)
ERYTHROCYTE [DISTWIDTH] IN CORD BLOOD: 12.3 % (ref 11.7–14.4)
GLUCOSE SERPLBLD-MCNC: 149 MG/DL (ref 74–118)
HCT VFR BLD AUTO: 40 % (ref 38.2–49.6)
HGB BLD-MCNC: 13.2 G/DL (ref 14–18)
LYMPHOCYTES # BLD: 1.2 10*3/UL (ref 1–3.2)
LYMPHOCYTES NFR BLD AUTO: 15.3 % (ref 18–39.1)
MCH RBC QN AUTO: 29.7 PG (ref 28–32)
MCHC RBC AUTO-ENTMCNC: 33 G/DL (ref 31–35)
MCV RBC AUTO: 90.1 FL (ref 81–99)
MONOCYTES # BLD AUTO: 0.9 10*3/UL (ref 0.2–0.8)
MONOCYTES NFR BLD AUTO: 12 % (ref 4.4–11.3)
NEUTS SEG NFR BLD AUTO: 67.6 % (ref 38.7–80)
PLATELET # BLD AUTO: 210 X10E3/UL (ref 140–360)
POTASSIUM SERPL-SCNC: 4.1 MMOL/L (ref 3.5–5.1)
RBC # BLD AUTO: 4.44 X10E6/UL (ref 4.3–5.7)
SODIUM SERPL-SCNC: 137 MMOL/L (ref 136–145)

## 2019-09-16 PROCEDURE — 0JBQ0ZZ EXCISION OF RIGHT FOOT SUBCUTANEOUS TISSUE AND FASCIA, OPEN APPROACH: ICD-10-PCS | Performed by: PODIATRIST

## 2019-09-16 RX ADMIN — RIVAROXABAN SCH MG: 10 TABLET, FILM COATED ORAL at 18:17

## 2019-09-16 RX ADMIN — AMLODIPINE BESYLATE SCH MG: 5 TABLET ORAL at 09:59

## 2019-09-16 RX ADMIN — LEVOTHYROXINE SODIUM SCH MCG: 50 TABLET ORAL at 05:42

## 2019-09-16 RX ADMIN — COLLAGENASE SANTYL SCH GM: 250 OINTMENT TOPICAL at 10:01

## 2019-09-16 RX ADMIN — INSULIN LISPRO SCH UNIT: 100 INJECTION, SOLUTION INTRAVENOUS; SUBCUTANEOUS at 21:45

## 2019-09-16 RX ADMIN — INSULIN LISPRO SCH UNIT: 100 INJECTION, SOLUTION INTRAVENOUS; SUBCUTANEOUS at 11:30

## 2019-09-16 RX ADMIN — INSULIN GLARGINE SCH UNITS: 100 INJECTION, SOLUTION SUBCUTANEOUS at 07:30

## 2019-09-16 RX ADMIN — METFORMIN HYDROCHLORIDE SCH MG: 500 TABLET, FILM COATED ORAL at 09:59

## 2019-09-16 RX ADMIN — METFORMIN HYDROCHLORIDE SCH MG: 500 TABLET, FILM COATED ORAL at 18:17

## 2019-09-16 RX ADMIN — Medication SCH MG: at 21:38

## 2019-09-16 RX ADMIN — PRIMIDONE SCH MG: 50 TABLET ORAL at 18:17

## 2019-09-16 RX ADMIN — TAZOBACTAM SODIUM AND PIPERACILLIN SODIUM SCH MLS/HR: 375; 3 INJECTION, SOLUTION INTRAVENOUS at 23:50

## 2019-09-16 RX ADMIN — TAZOBACTAM SODIUM AND PIPERACILLIN SODIUM SCH MLS/HR: 375; 3 INJECTION, SOLUTION INTRAVENOUS at 05:42

## 2019-09-16 RX ADMIN — INSULIN LISPRO SCH UNIT: 100 INJECTION, SOLUTION INTRAVENOUS; SUBCUTANEOUS at 16:30

## 2019-09-16 RX ADMIN — PRIMIDONE SCH MG: 50 TABLET ORAL at 09:59

## 2019-09-16 RX ADMIN — TAZOBACTAM SODIUM AND PIPERACILLIN SODIUM SCH MLS/HR: 375; 3 INJECTION, SOLUTION INTRAVENOUS at 12:43

## 2019-09-16 RX ADMIN — METOPROLOL SUCCINATE SCH MG: 25 TABLET, EXTENDED RELEASE ORAL at 21:38

## 2019-09-16 RX ADMIN — INSULIN LISPRO SCH UNIT: 100 INJECTION, SOLUTION INTRAVENOUS; SUBCUTANEOUS at 11:21

## 2019-09-16 RX ADMIN — TAZOBACTAM SODIUM AND PIPERACILLIN SODIUM SCH MLS/HR: 375; 3 INJECTION, SOLUTION INTRAVENOUS at 18:17

## 2019-09-16 RX ADMIN — RIVAROXABAN SCH MG: 10 TABLET, FILM COATED ORAL at 09:59

## 2019-09-16 RX ADMIN — VANCOMYCIN HYDROCHLORIDE SCH MLS/HR: 1 INJECTION, SOLUTION INTRAVENOUS at 22:02

## 2019-09-16 NOTE — PROGRESS NOTE
DATE:  09/16/2019  

 

SUBJECTIVE:  The patient is seen at bedside, doing better.  Denies any history of fever,

chills, nausea, or vomiting.  Decreased swelling and redness to his right foot. 

 

OBJECTIVE:  VITAL SIGNS:  Afebrile, pulse rate 70, respirations 20, blood pressure

136/77, and O2 saturation 97%. 

EXTREMITIES:  Pedal pulses diminished to both the DP and PT.  Skin temperature warm to

touch on his feet.  CFT to less than 5 seconds.  Has a grade 2 ulcer now down the

subcutaneous tissue, measuring 2.5 cm in diameter, sub 5th metatarsophalangeal joint.

No bone or tendon exposed.  Some fibrotic tissue noted down the subcutaneous tissue. 

 

LABORATORY DATA:  Labs noted, has a white blood cell count of 7.7, hemoglobin 13.2 with

a platelet count of 210. 

 

ASSESSMENT:  Grade 2 ulcer, diabetic neuropathy, peripheral arterial disease with

cellulitis and possible osteo. 

 

PLAN:  After discussing different options under no anesthesia secondary to his

peripheral neuropathy, sharp excisional debridement of the ulcer was carried down to

subcutaneous tissue.  Devitalized tissue sharply excised until good viable bleeding

tissue was achieved.  Devitalized tissue sharply excised via the use of a sterile 10

blade.  Sterile dressing was applied followed by Santyl with Santyl applied and is

followed by diluted wet-to-dry Betadine.  We will continue IV antibiotics such as

vancomycin and Zosyn.  Continue local wound care.  Possible discharge date 

will be Wednesday morning.  The patient continues to respond.  Following the ulcer

debridement, the ulcer measured approximately 2 to 2.5 cm in diameter. 

 

 

 

 

______________________________

NYA Chavez/ELISE

D:  09/16/2019 09:10:25

T:  09/16/2019 13:07:27

Job #:  [id number]

## 2019-09-17 VITALS — DIASTOLIC BLOOD PRESSURE: 84 MMHG | SYSTOLIC BLOOD PRESSURE: 177 MMHG

## 2019-09-17 VITALS — SYSTOLIC BLOOD PRESSURE: 123 MMHG | DIASTOLIC BLOOD PRESSURE: 73 MMHG

## 2019-09-17 VITALS — SYSTOLIC BLOOD PRESSURE: 139 MMHG | DIASTOLIC BLOOD PRESSURE: 75 MMHG

## 2019-09-17 VITALS — DIASTOLIC BLOOD PRESSURE: 72 MMHG | SYSTOLIC BLOOD PRESSURE: 114 MMHG

## 2019-09-17 VITALS — DIASTOLIC BLOOD PRESSURE: 82 MMHG | SYSTOLIC BLOOD PRESSURE: 149 MMHG

## 2019-09-17 VITALS — DIASTOLIC BLOOD PRESSURE: 82 MMHG | SYSTOLIC BLOOD PRESSURE: 146 MMHG

## 2019-09-17 VITALS — SYSTOLIC BLOOD PRESSURE: 177 MMHG | DIASTOLIC BLOOD PRESSURE: 84 MMHG

## 2019-09-17 VITALS — DIASTOLIC BLOOD PRESSURE: 73 MMHG | SYSTOLIC BLOOD PRESSURE: 123 MMHG

## 2019-09-17 RX ADMIN — TAZOBACTAM SODIUM AND PIPERACILLIN SODIUM SCH MLS/HR: 375; 3 INJECTION, SOLUTION INTRAVENOUS at 23:52

## 2019-09-17 RX ADMIN — METFORMIN HYDROCHLORIDE SCH MG: 500 TABLET, FILM COATED ORAL at 09:17

## 2019-09-17 RX ADMIN — RIVAROXABAN SCH MG: 10 TABLET, FILM COATED ORAL at 17:40

## 2019-09-17 RX ADMIN — PRIMIDONE SCH MG: 50 TABLET ORAL at 09:17

## 2019-09-17 RX ADMIN — INSULIN LISPRO SCH UNIT: 100 INJECTION, SOLUTION INTRAVENOUS; SUBCUTANEOUS at 07:30

## 2019-09-17 RX ADMIN — TAZOBACTAM SODIUM AND PIPERACILLIN SODIUM SCH MLS/HR: 375; 3 INJECTION, SOLUTION INTRAVENOUS at 05:52

## 2019-09-17 RX ADMIN — INSULIN LISPRO SCH UNIT: 100 INJECTION, SOLUTION INTRAVENOUS; SUBCUTANEOUS at 21:52

## 2019-09-17 RX ADMIN — COLLAGENASE SANTYL SCH GM: 250 OINTMENT TOPICAL at 09:17

## 2019-09-17 RX ADMIN — INSULIN LISPRO SCH UNIT: 100 INJECTION, SOLUTION INTRAVENOUS; SUBCUTANEOUS at 11:30

## 2019-09-17 RX ADMIN — Medication SCH MG: at 21:52

## 2019-09-17 RX ADMIN — RIVAROXABAN SCH MG: 10 TABLET, FILM COATED ORAL at 09:17

## 2019-09-17 RX ADMIN — LEVOTHYROXINE SODIUM SCH MCG: 50 TABLET ORAL at 05:53

## 2019-09-17 RX ADMIN — TAZOBACTAM SODIUM AND PIPERACILLIN SODIUM SCH MLS/HR: 375; 3 INJECTION, SOLUTION INTRAVENOUS at 13:02

## 2019-09-17 RX ADMIN — INSULIN GLARGINE SCH UNITS: 100 INJECTION, SOLUTION SUBCUTANEOUS at 07:30

## 2019-09-17 RX ADMIN — INSULIN LISPRO SCH UNIT: 100 INJECTION, SOLUTION INTRAVENOUS; SUBCUTANEOUS at 16:30

## 2019-09-17 RX ADMIN — AMLODIPINE BESYLATE SCH MG: 5 TABLET ORAL at 09:17

## 2019-09-17 RX ADMIN — METFORMIN HYDROCHLORIDE SCH MG: 500 TABLET, FILM COATED ORAL at 17:40

## 2019-09-17 RX ADMIN — METOPROLOL SUCCINATE SCH MG: 25 TABLET, EXTENDED RELEASE ORAL at 21:52

## 2019-09-17 RX ADMIN — PRIMIDONE SCH MG: 50 TABLET ORAL at 17:40

## 2019-09-17 RX ADMIN — TAZOBACTAM SODIUM AND PIPERACILLIN SODIUM SCH MLS/HR: 375; 3 INJECTION, SOLUTION INTRAVENOUS at 17:40

## 2019-09-17 NOTE — CONSULTATION
DATE OF CONSULTATION:  09/17/2019  

 

SUBJECTIVE:  The patient is at bedside, doing better, decreased pain, decreased

swelling.  Denies any history of fever, chills, nausea, or vomiting. 

 

OBJECTIVE:  VITAL SIGNS:  Afebrile, pulse rate 70, respirations 20, blood pressure

123/73, and O2 saturation 95%. 

EXTREMITIES:  Ulceration sub fifth metatarsopharyngeal joint has started to become nice

and granular with negative foul smell, decreased cellulitis, measuring 2 to 2.5 cm

diameter.  Pedal pulses are diminished. 

 

LABORATORY DATA:  Looking better.  White blood cell count 7.73.

 

ASSESSMENT:  Peripheral arterial disease, diabetic neuropathy with a grade 2 ulcer

healing. 

 

PLAN:  Continue local wound care.  Continue IV antibiotics.  The patient may be

discharged possibly tomorrow. 

 

 

 

 

______________________________

NYA Chavez/ELISE

D:  09/17/2019 08:24:39

T:  09/17/2019 14:24:44

Job #:  [id number]

## 2019-09-17 NOTE — NUR
Bedside report and walking rounds complete. Pt A&O, resting in bed and in no apparent 
distress. All safety measures ensured and pt call bell near. Pt is legally blind. Pt advised 
to call for assistance.

## 2019-09-18 VITALS — SYSTOLIC BLOOD PRESSURE: 146 MMHG | DIASTOLIC BLOOD PRESSURE: 76 MMHG

## 2019-09-18 VITALS — DIASTOLIC BLOOD PRESSURE: 70 MMHG | SYSTOLIC BLOOD PRESSURE: 140 MMHG

## 2019-09-18 VITALS — DIASTOLIC BLOOD PRESSURE: 85 MMHG | SYSTOLIC BLOOD PRESSURE: 146 MMHG

## 2019-09-18 RX ADMIN — VANCOMYCIN HYDROCHLORIDE SCH MLS/HR: 1 INJECTION, SOLUTION INTRAVENOUS at 01:18

## 2019-09-18 RX ADMIN — METFORMIN HYDROCHLORIDE SCH MG: 500 TABLET, FILM COATED ORAL at 08:41

## 2019-09-18 RX ADMIN — RIVAROXABAN SCH MG: 10 TABLET, FILM COATED ORAL at 08:41

## 2019-09-18 RX ADMIN — AMLODIPINE BESYLATE SCH MG: 5 TABLET ORAL at 08:41

## 2019-09-18 RX ADMIN — LEVOTHYROXINE SODIUM SCH MCG: 50 TABLET ORAL at 05:57

## 2019-09-18 RX ADMIN — PRIMIDONE SCH MG: 50 TABLET ORAL at 08:41

## 2019-09-18 RX ADMIN — COLLAGENASE SANTYL SCH GM: 250 OINTMENT TOPICAL at 08:41

## 2019-09-18 RX ADMIN — TAZOBACTAM SODIUM AND PIPERACILLIN SODIUM SCH MLS/HR: 375; 3 INJECTION, SOLUTION INTRAVENOUS at 05:57

## 2019-09-18 NOTE — PROGRESS NOTE
DATE:  09/18/2019  

 

SUBJECTIVE:  The patient is seen at bedside, doing significantly better.  Denies any

history of fever, chills, nausea, or vomiting. 

 

OBJECTIVE:  VITAL SIGNS:  Afebrile, pulse rate 70, respirations 18, blood pressure

146/76, and O2 saturation 97%. 

EXTREMITIES:  Ulceration to the plantar aspect of the 5th metatarsophalangeal joint, 2

to 2.5 cm in diameter.  No bone exposed, very close to the capsule and tendon with some

fibrotic and granulation tissue noted.  Decreased cellulitis to the dorsal aspect of

right foot with pedal pulses diminished. 

 

ASSESSMENT:  Peripheral arterial disease, diabetic neuropathy with a grade 3 ulcer.

 

PLAN:  Okay to be discharged on this date.  Prescription for doxycycline was given.  The

patient to continue local wound care at home.  He is to follow up this week in the

office. 

 

 

 

 

______________________________

NYA Chavez/ELISE

D:  09/18/2019 09:00:58

T:  09/18/2019 13:01:41

Job #:  [id number]

## 2019-09-18 NOTE — NUR
Received call from Imani carl [facility]. States they are able to accept pt. She will 
call pt and put pt on schedule for admit tomorrow.

## 2019-09-18 NOTE — NUR
Spoke to Dr. Biswas regarding discharge. He gave home health order for wound care. 

CM spoke to pt at bedside. States he thinks he had home health previously, but does not 
remember name. States can use any company that takes his insurance. Choice letter signed for 
[facility] and [facility] and placed in front of chart. Copy to pt with home 
health companies' contact information. 



CM informed pt that home health does not typically come out daily for wound care. Pt states 
that he can learn how to do the wound himself and that his wife can help him at home. 
DEMARCO Fuller to change dressing and teach pt prior to DC. 



IMM letter delivered and explained to pt. He verbalized understanding. Signed copy placed in 
chart. Copy to pt. 



Home health referral faxed to [facility]

Phone [phone]

Fax [phone]

## 2022-05-10 ENCOUNTER — HOSPITAL ENCOUNTER (INPATIENT)
Dept: HOSPITAL 88 - ER | Age: 77
LOS: 13 days | Discharge: NURSING, REHAB OR LONG-TERM CARE | DRG: 854 | End: 2022-05-23
Attending: INTERNAL MEDICINE | Admitting: INTERNAL MEDICINE
Payer: MEDICARE

## 2022-05-10 VITALS — BODY MASS INDEX: 26.97 KG/M2 | WEIGHT: 182.12 LBS | HEIGHT: 69 IN

## 2022-05-10 VITALS — SYSTOLIC BLOOD PRESSURE: 113 MMHG | DIASTOLIC BLOOD PRESSURE: 58 MMHG

## 2022-05-10 DIAGNOSIS — I25.10: ICD-10-CM

## 2022-05-10 DIAGNOSIS — E11.42: ICD-10-CM

## 2022-05-10 DIAGNOSIS — L97.514: ICD-10-CM

## 2022-05-10 DIAGNOSIS — I96: ICD-10-CM

## 2022-05-10 DIAGNOSIS — L02.611: ICD-10-CM

## 2022-05-10 DIAGNOSIS — R65.20: ICD-10-CM

## 2022-05-10 DIAGNOSIS — E78.5: ICD-10-CM

## 2022-05-10 DIAGNOSIS — Z79.4: ICD-10-CM

## 2022-05-10 DIAGNOSIS — Z95.820: ICD-10-CM

## 2022-05-10 DIAGNOSIS — I12.9: ICD-10-CM

## 2022-05-10 DIAGNOSIS — Z20.822: ICD-10-CM

## 2022-05-10 DIAGNOSIS — E87.2: ICD-10-CM

## 2022-05-10 DIAGNOSIS — I48.0: ICD-10-CM

## 2022-05-10 DIAGNOSIS — E11.621: ICD-10-CM

## 2022-05-10 DIAGNOSIS — Z90.49: ICD-10-CM

## 2022-05-10 DIAGNOSIS — A41.9: Primary | ICD-10-CM

## 2022-05-10 DIAGNOSIS — E11.52: ICD-10-CM

## 2022-05-10 DIAGNOSIS — E03.9: ICD-10-CM

## 2022-05-10 DIAGNOSIS — Z85.038: ICD-10-CM

## 2022-05-10 DIAGNOSIS — M86.171: ICD-10-CM

## 2022-05-10 DIAGNOSIS — E11.22: ICD-10-CM

## 2022-05-10 DIAGNOSIS — L03.115: ICD-10-CM

## 2022-05-10 DIAGNOSIS — E11.69: ICD-10-CM

## 2022-05-10 DIAGNOSIS — T87.81: ICD-10-CM

## 2022-05-10 DIAGNOSIS — N18.30: ICD-10-CM

## 2022-05-10 LAB
ALBUMIN SERPL-MCNC: 3.8 G/DL (ref 3.5–5)
ALBUMIN/GLOB SERPL: 1 {RATIO} (ref 0.8–2)
ALP SERPL-CCNC: 75 IU/L (ref 40–150)
ALT SERPL-CCNC: 23 IU/L (ref 0–55)
ANION GAP SERPL CALC-SCNC: 14.4 MMOL/L (ref 8–16)
BASOPHILS # BLD AUTO: 0 10*3/UL (ref 0–0.1)
BASOPHILS NFR BLD AUTO: 0.4 % (ref 0–1)
BUN SERPL-MCNC: 27 MG/DL (ref 7–26)
BUN/CREAT SERPL: 21 (ref 6–25)
CALCIUM SERPL-MCNC: 9.5 MG/DL (ref 8.4–10.2)
CHLORIDE SERPL-SCNC: 101 MMOL/L (ref 98–107)
CO2 SERPL-SCNC: 24 MMOL/L (ref 22–29)
DEPRECATED NEUTROPHILS # BLD AUTO: 8.3 10*3/UL (ref 2.1–6.9)
EOSINOPHIL # BLD AUTO: 0.2 10*3/UL (ref 0–0.4)
EOSINOPHIL NFR BLD AUTO: 1.6 % (ref 0–6)
ERYTHROCYTE [DISTWIDTH] IN CORD BLOOD: 13.5 % (ref 11.7–14.4)
GLOBULIN PLAS-MCNC: 3.9 G/DL (ref 2.3–3.5)
GLUCOSE SERPLBLD-MCNC: 53 MG/DL (ref 74–118)
HCT VFR BLD AUTO: 39.4 % (ref 38.2–49.6)
HGB BLD-MCNC: 12.7 G/DL (ref 14–18)
LYMPHOCYTES # BLD: 1.4 10*3/UL (ref 1–3.2)
LYMPHOCYTES NFR BLD AUTO: 12.3 % (ref 18–39.1)
MCH RBC QN AUTO: 29.7 PG (ref 28–32)
MCHC RBC AUTO-ENTMCNC: 32.2 G/DL (ref 31–35)
MCV RBC AUTO: 92.1 FL (ref 81–99)
MONOCYTES # BLD AUTO: 1.2 10*3/UL (ref 0.2–0.8)
MONOCYTES NFR BLD AUTO: 10.9 % (ref 4.4–11.3)
NEUTS SEG NFR BLD AUTO: 74.6 % (ref 38.7–80)
PLATELET # BLD AUTO: 178 X10E3/UL (ref 140–360)
POTASSIUM SERPL-SCNC: 4.4 MMOL/L (ref 3.5–5.1)
RBC # BLD AUTO: 4.28 X10E6/UL (ref 4.3–5.7)
SODIUM SERPL-SCNC: 135 MMOL/L (ref 136–145)

## 2022-05-10 PROCEDURE — 88304 TISSUE EXAM BY PATHOLOGIST: CPT

## 2022-05-10 PROCEDURE — 80053 COMPREHEN METABOLIC PANEL: CPT

## 2022-05-10 PROCEDURE — 93005 ELECTROCARDIOGRAM TRACING: CPT

## 2022-05-10 PROCEDURE — 88311 DECALCIFY TISSUE: CPT

## 2022-05-10 PROCEDURE — 87040 BLOOD CULTURE FOR BACTERIA: CPT

## 2022-05-10 PROCEDURE — 36415 COLL VENOUS BLD VENIPUNCTURE: CPT

## 2022-05-10 PROCEDURE — 96372 THER/PROPH/DIAG INJ SC/IM: CPT

## 2022-05-10 PROCEDURE — 87071 CULTURE AEROBIC QUANT OTHER: CPT

## 2022-05-10 PROCEDURE — 93306 TTE W/DOPPLER COMPLETE: CPT

## 2022-05-10 PROCEDURE — 97139 UNLISTED THERAPEUTIC PX: CPT

## 2022-05-10 PROCEDURE — 99251: CPT

## 2022-05-10 PROCEDURE — 82948 REAGENT STRIP/BLOOD GLUCOSE: CPT

## 2022-05-10 PROCEDURE — 99283 EMERGENCY DEPT VISIT LOW MDM: CPT

## 2022-05-10 PROCEDURE — 85025 COMPLETE CBC W/AUTO DIFF WBC: CPT

## 2022-05-10 PROCEDURE — 3E03329 INTRODUCTION OF OTHER ANTI-INFECTIVE INTO PERIPHERAL VEIN, PERCUTANEOUS APPROACH: ICD-10-PCS

## 2022-05-10 PROCEDURE — 87205 SMEAR GRAM STAIN: CPT

## 2022-05-10 PROCEDURE — 80048 BASIC METABOLIC PNL TOTAL CA: CPT

## 2022-05-10 PROCEDURE — 83605 ASSAY OF LACTIC ACID: CPT

## 2022-05-10 PROCEDURE — 80202 ASSAY OF VANCOMYCIN: CPT

## 2022-05-10 PROCEDURE — 88305 TISSUE EXAM BY PATHOLOGIST: CPT

## 2022-05-10 RX ADMIN — SODIUM CHLORIDE PRN MG: 900 INJECTION INTRAVENOUS at 18:36

## 2022-05-10 RX ADMIN — SODIUM CHLORIDE SCH MLS/HR: 9 INJECTION, SOLUTION INTRAVENOUS at 18:28

## 2022-05-10 RX ADMIN — INSULIN HUMAN SCH UNIT: 100 INJECTION, SOLUTION PARENTERAL at 21:00

## 2022-05-10 RX ADMIN — Medication PRN MG: at 18:36

## 2022-05-10 RX ADMIN — SODIUM CHLORIDE SCH MLS/HR: 9 INJECTION, SOLUTION INTRAVENOUS at 18:34

## 2022-05-10 NOTE — NUR
Received pt in bed watching tv with spouse at bedside. No S/S of resp distress. Denies pain 
at this time. Call light within reach and instructed to call for assistance. Pt and spouse 
verbalized understanding. Upon review of the In Basket request we were able to locate, review, and update the patient chart as requested for CRC: Kimberly  Any additional questions or concerns should be emailed to the Practice Liaisons via [email or web address] email, please do not reply via In Basket      Thank you  Concepcion Munoz

## 2022-05-11 VITALS — DIASTOLIC BLOOD PRESSURE: 75 MMHG | SYSTOLIC BLOOD PRESSURE: 132 MMHG

## 2022-05-11 VITALS — DIASTOLIC BLOOD PRESSURE: 58 MMHG | SYSTOLIC BLOOD PRESSURE: 106 MMHG

## 2022-05-11 VITALS — SYSTOLIC BLOOD PRESSURE: 110 MMHG | DIASTOLIC BLOOD PRESSURE: 59 MMHG

## 2022-05-11 VITALS — SYSTOLIC BLOOD PRESSURE: 117 MMHG | DIASTOLIC BLOOD PRESSURE: 68 MMHG

## 2022-05-11 VITALS — SYSTOLIC BLOOD PRESSURE: 132 MMHG | DIASTOLIC BLOOD PRESSURE: 75 MMHG

## 2022-05-11 LAB
ALBUMIN SERPL-MCNC: 3 G/DL (ref 3.5–5)
ALBUMIN/GLOB SERPL: 0.8 {RATIO} (ref 0.8–2)
ALP SERPL-CCNC: 63 IU/L (ref 40–150)
ALT SERPL-CCNC: 20 IU/L (ref 0–55)
ANION GAP SERPL CALC-SCNC: 9.9 MMOL/L (ref 8–16)
BASOPHILS # BLD AUTO: 0 10*3/UL (ref 0–0.1)
BASOPHILS NFR BLD AUTO: 0.4 % (ref 0–1)
BUN SERPL-MCNC: 23 MG/DL (ref 7–26)
BUN/CREAT SERPL: 21 (ref 6–25)
CALCIUM SERPL-MCNC: 8.2 MG/DL (ref 8.4–10.2)
CHLORIDE SERPL-SCNC: 105 MMOL/L (ref 98–107)
CO2 SERPL-SCNC: 25 MMOL/L (ref 22–29)
DEPRECATED NEUTROPHILS # BLD AUTO: 5.4 10*3/UL (ref 2.1–6.9)
EOSINOPHIL # BLD AUTO: 0.3 10*3/UL (ref 0–0.4)
EOSINOPHIL NFR BLD AUTO: 3.5 % (ref 0–6)
ERYTHROCYTE [DISTWIDTH] IN CORD BLOOD: 13.2 % (ref 11.7–14.4)
GLOBULIN PLAS-MCNC: 3.6 G/DL (ref 2.3–3.5)
GLUCOSE SERPLBLD-MCNC: 97 MG/DL (ref 74–118)
HCT VFR BLD AUTO: 36.8 % (ref 38.2–49.6)
HGB BLD-MCNC: 11.9 G/DL (ref 14–18)
LYMPHOCYTES # BLD: 0.7 10*3/UL (ref 1–3.2)
LYMPHOCYTES NFR BLD AUTO: 9.2 % (ref 18–39.1)
MCH RBC QN AUTO: 30.2 PG (ref 28–32)
MCHC RBC AUTO-ENTMCNC: 32.3 G/DL (ref 31–35)
MCV RBC AUTO: 93.4 FL (ref 81–99)
MONOCYTES # BLD AUTO: 0.8 10*3/UL (ref 0.2–0.8)
MONOCYTES NFR BLD AUTO: 10.6 % (ref 4.4–11.3)
NEUTS SEG NFR BLD AUTO: 76 % (ref 38.7–80)
PLATELET # BLD AUTO: 149 X10E3/UL (ref 140–360)
POTASSIUM SERPL-SCNC: 3.9 MMOL/L (ref 3.5–5.1)
RBC # BLD AUTO: 3.94 X10E6/UL (ref 4.3–5.7)
SODIUM SERPL-SCNC: 136 MMOL/L (ref 136–145)

## 2022-05-11 RX ADMIN — INSULIN HUMAN SCH UNIT: 100 INJECTION, SOLUTION PARENTERAL at 20:35

## 2022-05-11 RX ADMIN — INSULIN HUMAN SCH UNIT: 100 INJECTION, SOLUTION PARENTERAL at 07:30

## 2022-05-11 RX ADMIN — SODIUM CHLORIDE SCH MLS/HR: 9 INJECTION, SOLUTION INTRAVENOUS at 18:21

## 2022-05-11 RX ADMIN — INSULIN HUMAN SCH UNIT: 100 INJECTION, SOLUTION PARENTERAL at 12:00

## 2022-05-11 RX ADMIN — INSULIN HUMAN SCH UNIT: 100 INJECTION, SOLUTION PARENTERAL at 17:28

## 2022-05-12 VITALS — SYSTOLIC BLOOD PRESSURE: 134 MMHG | DIASTOLIC BLOOD PRESSURE: 72 MMHG

## 2022-05-12 VITALS — DIASTOLIC BLOOD PRESSURE: 65 MMHG | SYSTOLIC BLOOD PRESSURE: 127 MMHG

## 2022-05-12 VITALS — SYSTOLIC BLOOD PRESSURE: 112 MMHG | DIASTOLIC BLOOD PRESSURE: 66 MMHG

## 2022-05-12 VITALS — DIASTOLIC BLOOD PRESSURE: 73 MMHG | SYSTOLIC BLOOD PRESSURE: 127 MMHG

## 2022-05-12 VITALS — DIASTOLIC BLOOD PRESSURE: 79 MMHG | SYSTOLIC BLOOD PRESSURE: 136 MMHG

## 2022-05-12 VITALS — SYSTOLIC BLOOD PRESSURE: 136 MMHG | DIASTOLIC BLOOD PRESSURE: 79 MMHG

## 2022-05-12 VITALS — DIASTOLIC BLOOD PRESSURE: 76 MMHG | SYSTOLIC BLOOD PRESSURE: 126 MMHG

## 2022-05-12 VITALS — DIASTOLIC BLOOD PRESSURE: 71 MMHG | SYSTOLIC BLOOD PRESSURE: 139 MMHG

## 2022-05-12 PROCEDURE — 0Y6M0ZB DETACHMENT AT RIGHT FOOT, PARTIAL 2ND RAY, OPEN APPROACH: ICD-10-PCS | Performed by: PODIATRIST

## 2022-05-12 PROCEDURE — 3E0102A INTRODUCTION OF ANTI-INFECTIVE ENVELOPE INTO SUBCUTANEOUS TISSUE, OPEN APPROACH: ICD-10-PCS | Performed by: PODIATRIST

## 2022-05-12 RX ADMIN — SODIUM CHLORIDE SCH MLS/HR: 9 INJECTION, SOLUTION INTRAVENOUS at 18:30

## 2022-05-12 RX ADMIN — INSULIN HUMAN SCH UNIT: 100 INJECTION, SOLUTION PARENTERAL at 21:00

## 2022-05-12 RX ADMIN — INSULIN HUMAN SCH UNIT: 100 INJECTION, SOLUTION PARENTERAL at 09:02

## 2022-05-12 RX ADMIN — INSULIN HUMAN SCH UNIT: 100 INJECTION, SOLUTION PARENTERAL at 17:37

## 2022-05-12 RX ADMIN — INSULIN HUMAN SCH UNIT: 100 INJECTION, SOLUTION PARENTERAL at 11:30

## 2022-05-12 RX ADMIN — Medication PRN MG: at 22:04

## 2022-05-12 RX ADMIN — SODIUM CHLORIDE PRN MG: 900 INJECTION INTRAVENOUS at 22:05

## 2022-05-12 RX ADMIN — SODIUM CHLORIDE SCH MLS/HR: 9 INJECTION, SOLUTION INTRAVENOUS at 06:46

## 2022-05-13 VITALS — DIASTOLIC BLOOD PRESSURE: 68 MMHG | SYSTOLIC BLOOD PRESSURE: 110 MMHG

## 2022-05-13 VITALS — SYSTOLIC BLOOD PRESSURE: 140 MMHG | DIASTOLIC BLOOD PRESSURE: 77 MMHG

## 2022-05-13 VITALS — DIASTOLIC BLOOD PRESSURE: 68 MMHG | SYSTOLIC BLOOD PRESSURE: 121 MMHG

## 2022-05-13 VITALS — DIASTOLIC BLOOD PRESSURE: 68 MMHG | SYSTOLIC BLOOD PRESSURE: 131 MMHG

## 2022-05-13 VITALS — SYSTOLIC BLOOD PRESSURE: 120 MMHG | DIASTOLIC BLOOD PRESSURE: 68 MMHG

## 2022-05-13 RX ADMIN — INSULIN HUMAN SCH UNIT: 100 INJECTION, SOLUTION PARENTERAL at 21:00

## 2022-05-13 RX ADMIN — INSULIN HUMAN SCH UNIT: 100 INJECTION, SOLUTION PARENTERAL at 07:30

## 2022-05-13 RX ADMIN — SODIUM CHLORIDE SCH MLS/HR: 9 INJECTION, SOLUTION INTRAVENOUS at 17:18

## 2022-05-13 RX ADMIN — SODIUM CHLORIDE SCH MLS/HR: 9 INJECTION, SOLUTION INTRAVENOUS at 06:36

## 2022-05-13 RX ADMIN — INSULIN HUMAN SCH UNIT: 100 INJECTION, SOLUTION PARENTERAL at 15:26

## 2022-05-13 RX ADMIN — Medication SCH MG: at 21:09

## 2022-05-13 RX ADMIN — INSULIN HUMAN SCH UNIT: 100 INJECTION, SOLUTION PARENTERAL at 12:03

## 2022-05-14 VITALS — DIASTOLIC BLOOD PRESSURE: 72 MMHG | SYSTOLIC BLOOD PRESSURE: 113 MMHG

## 2022-05-14 VITALS — DIASTOLIC BLOOD PRESSURE: 70 MMHG | SYSTOLIC BLOOD PRESSURE: 133 MMHG

## 2022-05-14 VITALS — DIASTOLIC BLOOD PRESSURE: 82 MMHG | SYSTOLIC BLOOD PRESSURE: 160 MMHG

## 2022-05-14 VITALS — DIASTOLIC BLOOD PRESSURE: 69 MMHG | SYSTOLIC BLOOD PRESSURE: 114 MMHG

## 2022-05-14 VITALS — SYSTOLIC BLOOD PRESSURE: 144 MMHG | DIASTOLIC BLOOD PRESSURE: 75 MMHG

## 2022-05-14 VITALS — SYSTOLIC BLOOD PRESSURE: 126 MMHG | DIASTOLIC BLOOD PRESSURE: 73 MMHG

## 2022-05-14 RX ADMIN — INSULIN HUMAN SCH UNIT: 100 INJECTION, SOLUTION PARENTERAL at 16:30

## 2022-05-14 RX ADMIN — METOPROLOL SUCCINATE SCH MG: 25 TABLET, EXTENDED RELEASE ORAL at 09:10

## 2022-05-14 RX ADMIN — INSULIN HUMAN SCH UNIT: 100 INJECTION, SOLUTION PARENTERAL at 11:30

## 2022-05-14 RX ADMIN — INSULIN HUMAN SCH UNIT: 100 INJECTION, SOLUTION PARENTERAL at 07:30

## 2022-05-14 RX ADMIN — SODIUM CHLORIDE SCH MLS/HR: 9 INJECTION, SOLUTION INTRAVENOUS at 06:21

## 2022-05-14 RX ADMIN — LOSARTAN POTASSIUM SCH MG: 25 TABLET, FILM COATED ORAL at 09:10

## 2022-05-14 RX ADMIN — Medication SCH MG: at 21:28

## 2022-05-14 RX ADMIN — SODIUM CHLORIDE SCH MLS/HR: 9 INJECTION, SOLUTION INTRAVENOUS at 19:50

## 2022-05-15 VITALS — DIASTOLIC BLOOD PRESSURE: 93 MMHG | SYSTOLIC BLOOD PRESSURE: 129 MMHG

## 2022-05-15 VITALS — DIASTOLIC BLOOD PRESSURE: 76 MMHG | SYSTOLIC BLOOD PRESSURE: 121 MMHG

## 2022-05-15 VITALS — DIASTOLIC BLOOD PRESSURE: 71 MMHG | SYSTOLIC BLOOD PRESSURE: 135 MMHG

## 2022-05-15 VITALS — DIASTOLIC BLOOD PRESSURE: 76 MMHG | SYSTOLIC BLOOD PRESSURE: 148 MMHG

## 2022-05-15 VITALS — SYSTOLIC BLOOD PRESSURE: 142 MMHG | DIASTOLIC BLOOD PRESSURE: 74 MMHG

## 2022-05-15 VITALS — DIASTOLIC BLOOD PRESSURE: 71 MMHG | SYSTOLIC BLOOD PRESSURE: 131 MMHG

## 2022-05-15 VITALS — SYSTOLIC BLOOD PRESSURE: 148 MMHG | DIASTOLIC BLOOD PRESSURE: 76 MMHG

## 2022-05-15 RX ADMIN — ASPIRIN 81 MG CHEWABLE TABLET SCH MG: 81 TABLET CHEWABLE at 08:54

## 2022-05-15 RX ADMIN — INSULIN HUMAN SCH UNIT: 100 INJECTION, SOLUTION PARENTERAL at 21:34

## 2022-05-15 RX ADMIN — INSULIN HUMAN SCH UNIT: 100 INJECTION, SOLUTION PARENTERAL at 11:30

## 2022-05-15 RX ADMIN — SODIUM CHLORIDE SCH MLS/HR: 9 INJECTION, SOLUTION INTRAVENOUS at 06:22

## 2022-05-15 RX ADMIN — METOPROLOL SUCCINATE SCH MG: 25 TABLET, EXTENDED RELEASE ORAL at 08:54

## 2022-05-15 RX ADMIN — INSULIN HUMAN SCH UNIT: 100 INJECTION, SOLUTION PARENTERAL at 00:46

## 2022-05-15 RX ADMIN — INSULIN HUMAN SCH UNIT: 100 INJECTION, SOLUTION PARENTERAL at 16:30

## 2022-05-15 RX ADMIN — SODIUM CHLORIDE SCH MLS/HR: 9 INJECTION, SOLUTION INTRAVENOUS at 17:35

## 2022-05-15 RX ADMIN — INSULIN HUMAN SCH UNIT: 100 INJECTION, SOLUTION PARENTERAL at 07:30

## 2022-05-15 RX ADMIN — LOSARTAN POTASSIUM SCH MG: 25 TABLET, FILM COATED ORAL at 08:54

## 2022-05-15 RX ADMIN — Medication SCH MG: at 21:29

## 2022-05-16 VITALS — SYSTOLIC BLOOD PRESSURE: 132 MMHG | DIASTOLIC BLOOD PRESSURE: 67 MMHG

## 2022-05-16 VITALS — DIASTOLIC BLOOD PRESSURE: 57 MMHG | SYSTOLIC BLOOD PRESSURE: 112 MMHG

## 2022-05-16 VITALS — DIASTOLIC BLOOD PRESSURE: 74 MMHG | SYSTOLIC BLOOD PRESSURE: 127 MMHG

## 2022-05-16 VITALS — DIASTOLIC BLOOD PRESSURE: 80 MMHG | SYSTOLIC BLOOD PRESSURE: 137 MMHG

## 2022-05-16 VITALS — DIASTOLIC BLOOD PRESSURE: 69 MMHG | SYSTOLIC BLOOD PRESSURE: 126 MMHG

## 2022-05-16 VITALS — SYSTOLIC BLOOD PRESSURE: 117 MMHG | DIASTOLIC BLOOD PRESSURE: 81 MMHG

## 2022-05-16 VITALS — SYSTOLIC BLOOD PRESSURE: 137 MMHG | DIASTOLIC BLOOD PRESSURE: 80 MMHG

## 2022-05-16 VITALS — SYSTOLIC BLOOD PRESSURE: 112 MMHG | DIASTOLIC BLOOD PRESSURE: 57 MMHG

## 2022-05-16 RX ADMIN — INSULIN HUMAN SCH UNIT: 100 INJECTION, SOLUTION PARENTERAL at 22:05

## 2022-05-16 RX ADMIN — LOSARTAN POTASSIUM SCH MG: 25 TABLET, FILM COATED ORAL at 08:43

## 2022-05-16 RX ADMIN — INSULIN HUMAN SCH UNIT: 100 INJECTION, SOLUTION PARENTERAL at 12:27

## 2022-05-16 RX ADMIN — METOPROLOL SUCCINATE SCH MG: 25 TABLET, EXTENDED RELEASE ORAL at 08:43

## 2022-05-16 RX ADMIN — INSULIN HUMAN SCH UNIT: 100 INJECTION, SOLUTION PARENTERAL at 16:19

## 2022-05-16 RX ADMIN — SODIUM CHLORIDE SCH MLS/HR: 9 INJECTION, SOLUTION INTRAVENOUS at 06:53

## 2022-05-16 RX ADMIN — Medication SCH MG: at 20:16

## 2022-05-16 RX ADMIN — INSULIN HUMAN SCH UNIT: 100 INJECTION, SOLUTION PARENTERAL at 07:30

## 2022-05-16 RX ADMIN — SODIUM CHLORIDE SCH MLS/HR: 9 INJECTION, SOLUTION INTRAVENOUS at 18:42

## 2022-05-16 RX ADMIN — ASPIRIN 81 MG CHEWABLE TABLET SCH MG: 81 TABLET CHEWABLE at 08:42

## 2022-05-17 VITALS — DIASTOLIC BLOOD PRESSURE: 80 MMHG | SYSTOLIC BLOOD PRESSURE: 136 MMHG

## 2022-05-17 VITALS — SYSTOLIC BLOOD PRESSURE: 129 MMHG | DIASTOLIC BLOOD PRESSURE: 88 MMHG

## 2022-05-17 VITALS — SYSTOLIC BLOOD PRESSURE: 147 MMHG | DIASTOLIC BLOOD PRESSURE: 74 MMHG

## 2022-05-17 VITALS — DIASTOLIC BLOOD PRESSURE: 68 MMHG | SYSTOLIC BLOOD PRESSURE: 131 MMHG

## 2022-05-17 VITALS — SYSTOLIC BLOOD PRESSURE: 150 MMHG | DIASTOLIC BLOOD PRESSURE: 86 MMHG

## 2022-05-17 VITALS — SYSTOLIC BLOOD PRESSURE: 124 MMHG | DIASTOLIC BLOOD PRESSURE: 72 MMHG

## 2022-05-17 RX ADMIN — METOPROLOL SUCCINATE SCH MG: 25 TABLET, EXTENDED RELEASE ORAL at 08:28

## 2022-05-17 RX ADMIN — ASPIRIN 81 MG CHEWABLE TABLET SCH MG: 81 TABLET CHEWABLE at 08:27

## 2022-05-17 RX ADMIN — Medication SCH MG: at 20:40

## 2022-05-17 RX ADMIN — INSULIN HUMAN SCH UNIT: 100 INJECTION, SOLUTION PARENTERAL at 11:37

## 2022-05-17 RX ADMIN — SODIUM CHLORIDE SCH MLS/HR: 9 INJECTION, SOLUTION INTRAVENOUS at 18:49

## 2022-05-17 RX ADMIN — INSULIN HUMAN SCH UNIT: 100 INJECTION, SOLUTION PARENTERAL at 21:23

## 2022-05-17 RX ADMIN — INSULIN HUMAN SCH UNIT: 100 INJECTION, SOLUTION PARENTERAL at 16:19

## 2022-05-17 RX ADMIN — LOSARTAN POTASSIUM SCH MG: 25 TABLET, FILM COATED ORAL at 08:28

## 2022-05-17 RX ADMIN — SODIUM CHLORIDE SCH MLS/HR: 9 INJECTION, SOLUTION INTRAVENOUS at 05:23

## 2022-05-17 RX ADMIN — INSULIN HUMAN SCH UNIT: 100 INJECTION, SOLUTION PARENTERAL at 08:29

## 2022-05-18 VITALS — SYSTOLIC BLOOD PRESSURE: 116 MMHG | DIASTOLIC BLOOD PRESSURE: 65 MMHG

## 2022-05-18 VITALS — DIASTOLIC BLOOD PRESSURE: 73 MMHG | SYSTOLIC BLOOD PRESSURE: 142 MMHG

## 2022-05-18 VITALS — SYSTOLIC BLOOD PRESSURE: 131 MMHG | DIASTOLIC BLOOD PRESSURE: 74 MMHG

## 2022-05-18 VITALS — DIASTOLIC BLOOD PRESSURE: 69 MMHG | SYSTOLIC BLOOD PRESSURE: 135 MMHG

## 2022-05-18 VITALS — SYSTOLIC BLOOD PRESSURE: 131 MMHG | DIASTOLIC BLOOD PRESSURE: 71 MMHG

## 2022-05-18 VITALS — DIASTOLIC BLOOD PRESSURE: 80 MMHG | SYSTOLIC BLOOD PRESSURE: 142 MMHG

## 2022-05-18 VITALS — SYSTOLIC BLOOD PRESSURE: 142 MMHG | DIASTOLIC BLOOD PRESSURE: 73 MMHG

## 2022-05-18 LAB
ANION GAP SERPL CALC-SCNC: 10.4 MMOL/L (ref 8–16)
BASOPHILS # BLD AUTO: 0 10*3/UL (ref 0–0.1)
BASOPHILS NFR BLD AUTO: 0.7 % (ref 0–1)
BUN SERPL-MCNC: 21 MG/DL (ref 7–26)
BUN/CREAT SERPL: 18 (ref 6–25)
CALCIUM SERPL-MCNC: 9.5 MG/DL (ref 8.4–10.2)
CHLORIDE SERPL-SCNC: 98 MMOL/L (ref 98–107)
CO2 SERPL-SCNC: 29 MMOL/L (ref 22–29)
DEPRECATED NEUTROPHILS # BLD AUTO: 4.2 10*3/UL (ref 2.1–6.9)
EOSINOPHIL # BLD AUTO: 0.3 10*3/UL (ref 0–0.4)
EOSINOPHIL NFR BLD AUTO: 4.4 % (ref 0–6)
ERYTHROCYTE [DISTWIDTH] IN CORD BLOOD: 12.7 % (ref 11.7–14.4)
GLUCOSE SERPLBLD-MCNC: 377 MG/DL (ref 74–118)
HCT VFR BLD AUTO: 43.4 % (ref 38.2–49.6)
HGB BLD-MCNC: 14.1 G/DL (ref 14–18)
LYMPHOCYTES # BLD: 0.9 10*3/UL (ref 1–3.2)
LYMPHOCYTES NFR BLD AUTO: 14.3 % (ref 18–39.1)
MCH RBC QN AUTO: 29.5 PG (ref 28–32)
MCHC RBC AUTO-ENTMCNC: 32.5 G/DL (ref 31–35)
MCV RBC AUTO: 90.8 FL (ref 81–99)
MONOCYTES # BLD AUTO: 0.6 10*3/UL (ref 0.2–0.8)
MONOCYTES NFR BLD AUTO: 10.3 % (ref 4.4–11.3)
NEUTS SEG NFR BLD AUTO: 70 % (ref 38.7–80)
PLATELET # BLD AUTO: 208 X10E3/UL (ref 140–360)
POTASSIUM SERPL-SCNC: 4.4 MMOL/L (ref 3.5–5.1)
RBC # BLD AUTO: 4.78 X10E6/UL (ref 4.3–5.7)
SODIUM SERPL-SCNC: 133 MMOL/L (ref 136–145)

## 2022-05-18 RX ADMIN — Medication SCH MG: at 20:59

## 2022-05-18 RX ADMIN — INSULIN HUMAN SCH UNIT: 100 INJECTION, SOLUTION PARENTERAL at 07:30

## 2022-05-18 RX ADMIN — PRIMIDONE SCH MG: 50 TABLET ORAL at 09:48

## 2022-05-18 RX ADMIN — PRIMIDONE SCH MG: 50 TABLET ORAL at 17:50

## 2022-05-18 RX ADMIN — INSULIN GLARGINE SCH UNITS: 100 INJECTION, SOLUTION SUBCUTANEOUS at 21:00

## 2022-05-18 RX ADMIN — SODIUM CHLORIDE SCH MLS/HR: 9 INJECTION, SOLUTION INTRAVENOUS at 06:22

## 2022-05-18 RX ADMIN — INSULIN HUMAN SCH UNIT: 100 INJECTION, SOLUTION PARENTERAL at 16:30

## 2022-05-18 RX ADMIN — LOSARTAN POTASSIUM SCH MG: 25 TABLET, FILM COATED ORAL at 09:48

## 2022-05-18 RX ADMIN — ASPIRIN 81 MG CHEWABLE TABLET SCH MG: 81 TABLET CHEWABLE at 09:48

## 2022-05-18 RX ADMIN — SERTRALINE HYDROCHLORIDE SCH MG: 50 TABLET, FILM COATED ORAL at 09:49

## 2022-05-18 RX ADMIN — INSULIN HUMAN SCH UNIT: 100 INJECTION, SOLUTION PARENTERAL at 11:30

## 2022-05-18 RX ADMIN — LEVOTHYROXINE SODIUM SCH MCG: 50 TABLET ORAL at 09:48

## 2022-05-18 RX ADMIN — SODIUM CHLORIDE SCH MLS/HR: 9 INJECTION, SOLUTION INTRAVENOUS at 18:30

## 2022-05-18 RX ADMIN — METOPROLOL SUCCINATE SCH MG: 25 TABLET, EXTENDED RELEASE ORAL at 09:48

## 2022-05-18 RX ADMIN — INSULIN HUMAN SCH UNIT: 100 INJECTION, SOLUTION PARENTERAL at 21:00

## 2022-05-18 RX ADMIN — Medication SCH MG: at 17:50

## 2022-05-18 RX ADMIN — Medication SCH MG: at 09:48

## 2022-05-19 VITALS — DIASTOLIC BLOOD PRESSURE: 73 MMHG | SYSTOLIC BLOOD PRESSURE: 147 MMHG

## 2022-05-19 VITALS — SYSTOLIC BLOOD PRESSURE: 134 MMHG | DIASTOLIC BLOOD PRESSURE: 74 MMHG

## 2022-05-19 VITALS — SYSTOLIC BLOOD PRESSURE: 152 MMHG | DIASTOLIC BLOOD PRESSURE: 84 MMHG

## 2022-05-19 VITALS — DIASTOLIC BLOOD PRESSURE: 83 MMHG | SYSTOLIC BLOOD PRESSURE: 145 MMHG

## 2022-05-19 VITALS — SYSTOLIC BLOOD PRESSURE: 133 MMHG | DIASTOLIC BLOOD PRESSURE: 70 MMHG

## 2022-05-19 VITALS — SYSTOLIC BLOOD PRESSURE: 140 MMHG | DIASTOLIC BLOOD PRESSURE: 75 MMHG

## 2022-05-19 VITALS — DIASTOLIC BLOOD PRESSURE: 84 MMHG | SYSTOLIC BLOOD PRESSURE: 152 MMHG

## 2022-05-19 VITALS — DIASTOLIC BLOOD PRESSURE: 86 MMHG | SYSTOLIC BLOOD PRESSURE: 133 MMHG

## 2022-05-19 RX ADMIN — METOPROLOL SUCCINATE SCH MG: 25 TABLET, EXTENDED RELEASE ORAL at 09:26

## 2022-05-19 RX ADMIN — INSULIN GLARGINE SCH UNITS: 100 INJECTION, SOLUTION SUBCUTANEOUS at 21:00

## 2022-05-19 RX ADMIN — SODIUM CHLORIDE SCH MLS/HR: 9 INJECTION, SOLUTION INTRAVENOUS at 05:48

## 2022-05-19 RX ADMIN — INSULIN HUMAN SCH UNIT: 100 INJECTION, SOLUTION PARENTERAL at 16:30

## 2022-05-19 RX ADMIN — LOSARTAN POTASSIUM SCH MG: 25 TABLET, FILM COATED ORAL at 09:25

## 2022-05-19 RX ADMIN — SODIUM CHLORIDE SCH MLS/HR: 9 INJECTION, SOLUTION INTRAVENOUS at 18:28

## 2022-05-19 RX ADMIN — SERTRALINE HYDROCHLORIDE SCH MG: 50 TABLET, FILM COATED ORAL at 09:26

## 2022-05-19 RX ADMIN — Medication SCH MG: at 21:28

## 2022-05-19 RX ADMIN — Medication SCH MG: at 09:25

## 2022-05-19 RX ADMIN — PRIMIDONE SCH MG: 50 TABLET ORAL at 17:14

## 2022-05-19 RX ADMIN — ASPIRIN 81 MG CHEWABLE TABLET SCH MG: 81 TABLET CHEWABLE at 09:25

## 2022-05-19 RX ADMIN — INSULIN HUMAN SCH UNIT: 100 INJECTION, SOLUTION PARENTERAL at 07:30

## 2022-05-19 RX ADMIN — PRIMIDONE SCH MG: 50 TABLET ORAL at 09:25

## 2022-05-19 RX ADMIN — Medication SCH MG: at 17:14

## 2022-05-19 RX ADMIN — INSULIN HUMAN SCH UNIT: 100 INJECTION, SOLUTION PARENTERAL at 11:30

## 2022-05-19 RX ADMIN — LEVOTHYROXINE SODIUM SCH MCG: 50 TABLET ORAL at 05:48

## 2022-05-19 RX ADMIN — INSULIN HUMAN SCH UNIT: 100 INJECTION, SOLUTION PARENTERAL at 21:00

## 2022-05-20 VITALS — DIASTOLIC BLOOD PRESSURE: 60 MMHG | SYSTOLIC BLOOD PRESSURE: 119 MMHG

## 2022-05-20 VITALS — DIASTOLIC BLOOD PRESSURE: 66 MMHG | SYSTOLIC BLOOD PRESSURE: 125 MMHG

## 2022-05-20 VITALS — SYSTOLIC BLOOD PRESSURE: 133 MMHG | DIASTOLIC BLOOD PRESSURE: 80 MMHG

## 2022-05-20 VITALS — SYSTOLIC BLOOD PRESSURE: 118 MMHG | DIASTOLIC BLOOD PRESSURE: 72 MMHG

## 2022-05-20 VITALS — DIASTOLIC BLOOD PRESSURE: 75 MMHG | SYSTOLIC BLOOD PRESSURE: 128 MMHG

## 2022-05-20 VITALS — SYSTOLIC BLOOD PRESSURE: 133 MMHG | DIASTOLIC BLOOD PRESSURE: 89 MMHG

## 2022-05-20 VITALS — SYSTOLIC BLOOD PRESSURE: 128 MMHG | DIASTOLIC BLOOD PRESSURE: 75 MMHG

## 2022-05-20 VITALS — DIASTOLIC BLOOD PRESSURE: 86 MMHG | SYSTOLIC BLOOD PRESSURE: 147 MMHG

## 2022-05-20 RX ADMIN — SODIUM CHLORIDE SCH MLS/HR: 9 INJECTION, SOLUTION INTRAVENOUS at 06:09

## 2022-05-20 RX ADMIN — SERTRALINE HYDROCHLORIDE SCH MG: 50 TABLET, FILM COATED ORAL at 08:50

## 2022-05-20 RX ADMIN — INSULIN HUMAN SCH UNIT: 100 INJECTION, SOLUTION PARENTERAL at 16:30

## 2022-05-20 RX ADMIN — LOSARTAN POTASSIUM SCH MG: 25 TABLET, FILM COATED ORAL at 08:50

## 2022-05-20 RX ADMIN — Medication SCH MG: at 21:21

## 2022-05-20 RX ADMIN — INSULIN HUMAN SCH UNIT: 100 INJECTION, SOLUTION PARENTERAL at 11:30

## 2022-05-20 RX ADMIN — PRIMIDONE SCH MG: 50 TABLET ORAL at 08:50

## 2022-05-20 RX ADMIN — INSULIN HUMAN SCH UNIT: 100 INJECTION, SOLUTION PARENTERAL at 21:00

## 2022-05-20 RX ADMIN — METFORMIN HYDROCHLORIDE SCH MG: 500 TABLET, FILM COATED ORAL at 16:24

## 2022-05-20 RX ADMIN — INSULIN GLARGINE SCH UNITS: 100 INJECTION, SOLUTION SUBCUTANEOUS at 21:00

## 2022-05-20 RX ADMIN — Medication SCH MG: at 08:50

## 2022-05-20 RX ADMIN — INSULIN HUMAN SCH UNIT: 100 INJECTION, SOLUTION PARENTERAL at 07:30

## 2022-05-20 RX ADMIN — LEVOTHYROXINE SODIUM SCH MCG: 50 TABLET ORAL at 06:09

## 2022-05-20 RX ADMIN — METOPROLOL SUCCINATE SCH MG: 25 TABLET, EXTENDED RELEASE ORAL at 08:50

## 2022-05-20 RX ADMIN — PRIMIDONE SCH MG: 50 TABLET ORAL at 16:24

## 2022-05-20 RX ADMIN — ASPIRIN 81 MG CHEWABLE TABLET SCH MG: 81 TABLET CHEWABLE at 08:49

## 2022-05-20 RX ADMIN — Medication SCH MG: at 16:24

## 2022-05-20 RX ADMIN — SODIUM CHLORIDE SCH MLS/HR: 9 INJECTION, SOLUTION INTRAVENOUS at 19:16

## 2022-05-20 RX ADMIN — EMPAGLIFLOZIN SCH MG: 10 TABLET, FILM COATED ORAL at 13:05

## 2022-05-21 VITALS — SYSTOLIC BLOOD PRESSURE: 123 MMHG | DIASTOLIC BLOOD PRESSURE: 64 MMHG

## 2022-05-21 VITALS — DIASTOLIC BLOOD PRESSURE: 73 MMHG | SYSTOLIC BLOOD PRESSURE: 130 MMHG

## 2022-05-21 VITALS — SYSTOLIC BLOOD PRESSURE: 132 MMHG | DIASTOLIC BLOOD PRESSURE: 71 MMHG

## 2022-05-21 VITALS — DIASTOLIC BLOOD PRESSURE: 74 MMHG | SYSTOLIC BLOOD PRESSURE: 135 MMHG

## 2022-05-21 RX ADMIN — METFORMIN HYDROCHLORIDE SCH MG: 500 TABLET, FILM COATED ORAL at 16:34

## 2022-05-21 RX ADMIN — SODIUM CHLORIDE SCH MLS/HR: 9 INJECTION, SOLUTION INTRAVENOUS at 18:17

## 2022-05-21 RX ADMIN — ASPIRIN 81 MG CHEWABLE TABLET SCH MG: 81 TABLET CHEWABLE at 09:37

## 2022-05-21 RX ADMIN — Medication SCH MG: at 09:37

## 2022-05-21 RX ADMIN — INSULIN HUMAN SCH UNIT: 100 INJECTION, SOLUTION PARENTERAL at 16:34

## 2022-05-21 RX ADMIN — LEVOTHYROXINE SODIUM SCH MCG: 50 TABLET ORAL at 06:10

## 2022-05-21 RX ADMIN — LOSARTAN POTASSIUM SCH MG: 25 TABLET, FILM COATED ORAL at 09:38

## 2022-05-21 RX ADMIN — METOPROLOL SUCCINATE SCH MG: 25 TABLET, EXTENDED RELEASE ORAL at 09:39

## 2022-05-21 RX ADMIN — SERTRALINE HYDROCHLORIDE SCH MG: 50 TABLET, FILM COATED ORAL at 09:40

## 2022-05-21 RX ADMIN — METFORMIN HYDROCHLORIDE SCH MG: 500 TABLET, FILM COATED ORAL at 09:39

## 2022-05-21 RX ADMIN — SODIUM CHLORIDE SCH MLS/HR: 9 INJECTION, SOLUTION INTRAVENOUS at 06:51

## 2022-05-21 RX ADMIN — Medication SCH MG: at 21:00

## 2022-05-21 RX ADMIN — INSULIN HUMAN SCH UNIT: 100 INJECTION, SOLUTION PARENTERAL at 21:00

## 2022-05-21 RX ADMIN — INSULIN HUMAN SCH UNIT: 100 INJECTION, SOLUTION PARENTERAL at 07:30

## 2022-05-21 RX ADMIN — EMPAGLIFLOZIN SCH MG: 10 TABLET, FILM COATED ORAL at 09:38

## 2022-05-21 RX ADMIN — INSULIN HUMAN SCH UNIT: 100 INJECTION, SOLUTION PARENTERAL at 11:30

## 2022-05-21 RX ADMIN — PRIMIDONE SCH MG: 50 TABLET ORAL at 16:34

## 2022-05-21 RX ADMIN — PRIMIDONE SCH MG: 50 TABLET ORAL at 09:38

## 2022-05-21 RX ADMIN — Medication SCH MG: at 16:34

## 2022-05-21 RX ADMIN — INSULIN GLARGINE SCH UNITS: 100 INJECTION, SOLUTION SUBCUTANEOUS at 21:00

## 2022-05-22 VITALS — DIASTOLIC BLOOD PRESSURE: 80 MMHG | SYSTOLIC BLOOD PRESSURE: 145 MMHG

## 2022-05-22 VITALS — SYSTOLIC BLOOD PRESSURE: 135 MMHG | DIASTOLIC BLOOD PRESSURE: 66 MMHG

## 2022-05-22 VITALS — SYSTOLIC BLOOD PRESSURE: 138 MMHG | DIASTOLIC BLOOD PRESSURE: 73 MMHG

## 2022-05-22 VITALS — SYSTOLIC BLOOD PRESSURE: 124 MMHG | DIASTOLIC BLOOD PRESSURE: 74 MMHG

## 2022-05-22 VITALS — DIASTOLIC BLOOD PRESSURE: 73 MMHG | SYSTOLIC BLOOD PRESSURE: 130 MMHG

## 2022-05-22 VITALS — SYSTOLIC BLOOD PRESSURE: 138 MMHG | DIASTOLIC BLOOD PRESSURE: 81 MMHG

## 2022-05-22 VITALS — DIASTOLIC BLOOD PRESSURE: 73 MMHG | SYSTOLIC BLOOD PRESSURE: 131 MMHG

## 2022-05-22 RX ADMIN — ASPIRIN 81 MG CHEWABLE TABLET SCH MG: 81 TABLET CHEWABLE at 10:38

## 2022-05-22 RX ADMIN — METFORMIN HYDROCHLORIDE SCH MG: 500 TABLET, FILM COATED ORAL at 17:26

## 2022-05-22 RX ADMIN — METOPROLOL SUCCINATE SCH MG: 25 TABLET, EXTENDED RELEASE ORAL at 09:00

## 2022-05-22 RX ADMIN — INSULIN HUMAN SCH UNIT: 100 INJECTION, SOLUTION PARENTERAL at 07:30

## 2022-05-22 RX ADMIN — LEVOTHYROXINE SODIUM SCH MCG: 50 TABLET ORAL at 06:00

## 2022-05-22 RX ADMIN — LOSARTAN POTASSIUM SCH MG: 25 TABLET, FILM COATED ORAL at 09:00

## 2022-05-22 RX ADMIN — Medication SCH MG: at 17:26

## 2022-05-22 RX ADMIN — INSULIN HUMAN SCH UNIT: 100 INJECTION, SOLUTION PARENTERAL at 21:00

## 2022-05-22 RX ADMIN — SERTRALINE HYDROCHLORIDE SCH MG: 50 TABLET, FILM COATED ORAL at 10:38

## 2022-05-22 RX ADMIN — Medication SCH MG: at 10:38

## 2022-05-22 RX ADMIN — INSULIN HUMAN SCH UNIT: 100 INJECTION, SOLUTION PARENTERAL at 11:30

## 2022-05-22 RX ADMIN — PRIMIDONE SCH MG: 50 TABLET ORAL at 17:26

## 2022-05-22 RX ADMIN — METFORMIN HYDROCHLORIDE SCH MG: 500 TABLET, FILM COATED ORAL at 10:34

## 2022-05-22 RX ADMIN — EMPAGLIFLOZIN SCH MG: 10 TABLET, FILM COATED ORAL at 10:38

## 2022-05-22 RX ADMIN — PRIMIDONE SCH MG: 50 TABLET ORAL at 10:38

## 2022-05-22 RX ADMIN — INSULIN GLARGINE SCH UNITS: 100 INJECTION, SOLUTION SUBCUTANEOUS at 21:00

## 2022-05-22 RX ADMIN — INSULIN HUMAN SCH UNIT: 100 INJECTION, SOLUTION PARENTERAL at 16:30

## 2022-05-22 RX ADMIN — Medication SCH MG: at 21:00

## 2022-05-22 RX ADMIN — SODIUM CHLORIDE SCH MLS/HR: 9 INJECTION, SOLUTION INTRAVENOUS at 06:30

## 2022-05-23 VITALS — DIASTOLIC BLOOD PRESSURE: 73 MMHG | SYSTOLIC BLOOD PRESSURE: 120 MMHG

## 2022-05-23 VITALS — DIASTOLIC BLOOD PRESSURE: 76 MMHG | SYSTOLIC BLOOD PRESSURE: 128 MMHG

## 2022-05-23 VITALS — SYSTOLIC BLOOD PRESSURE: 138 MMHG | DIASTOLIC BLOOD PRESSURE: 73 MMHG

## 2022-05-23 VITALS — SYSTOLIC BLOOD PRESSURE: 125 MMHG | DIASTOLIC BLOOD PRESSURE: 67 MMHG

## 2022-05-23 VITALS — DIASTOLIC BLOOD PRESSURE: 63 MMHG | SYSTOLIC BLOOD PRESSURE: 104 MMHG

## 2022-05-23 VITALS — SYSTOLIC BLOOD PRESSURE: 128 MMHG | DIASTOLIC BLOOD PRESSURE: 76 MMHG

## 2022-05-23 RX ADMIN — INSULIN HUMAN SCH UNIT: 100 INJECTION, SOLUTION PARENTERAL at 16:30

## 2022-05-23 RX ADMIN — EMPAGLIFLOZIN SCH MG: 10 TABLET, FILM COATED ORAL at 10:10

## 2022-05-23 RX ADMIN — INSULIN GLARGINE SCH UNITS: 100 INJECTION, SOLUTION SUBCUTANEOUS at 21:09

## 2022-05-23 RX ADMIN — PRIMIDONE SCH MG: 50 TABLET ORAL at 10:10

## 2022-05-23 RX ADMIN — INSULIN HUMAN SCH UNIT: 100 INJECTION, SOLUTION PARENTERAL at 13:20

## 2022-05-23 RX ADMIN — SERTRALINE HYDROCHLORIDE SCH MG: 50 TABLET, FILM COATED ORAL at 10:11

## 2022-05-23 RX ADMIN — INSULIN HUMAN SCH UNIT: 100 INJECTION, SOLUTION PARENTERAL at 21:00

## 2022-05-23 RX ADMIN — METFORMIN HYDROCHLORIDE SCH MG: 500 TABLET, FILM COATED ORAL at 17:00

## 2022-05-23 RX ADMIN — METOPROLOL SUCCINATE SCH MG: 25 TABLET, EXTENDED RELEASE ORAL at 10:10

## 2022-05-23 RX ADMIN — Medication SCH MG: at 10:10

## 2022-05-23 RX ADMIN — ASPIRIN 81 MG CHEWABLE TABLET SCH MG: 81 TABLET CHEWABLE at 10:09

## 2022-05-23 RX ADMIN — PRIMIDONE SCH MG: 50 TABLET ORAL at 17:00

## 2022-05-23 RX ADMIN — INSULIN HUMAN SCH UNIT: 100 INJECTION, SOLUTION PARENTERAL at 07:30

## 2022-05-23 RX ADMIN — LEVOTHYROXINE SODIUM SCH MCG: 50 TABLET ORAL at 06:00

## 2022-05-23 RX ADMIN — Medication SCH MG: at 21:07

## 2022-05-23 RX ADMIN — METFORMIN HYDROCHLORIDE SCH MG: 500 TABLET, FILM COATED ORAL at 10:09

## 2022-05-23 RX ADMIN — Medication SCH MG: at 17:00

## 2022-05-23 RX ADMIN — LOSARTAN POTASSIUM SCH MG: 25 TABLET, FILM COATED ORAL at 10:10

## 2022-12-01 ENCOUNTER — HOSPITAL ENCOUNTER (EMERGENCY)
Dept: HOSPITAL 88 - ER | Age: 77
Discharge: HOME | End: 2022-12-01
Payer: MEDICARE

## 2022-12-01 VITALS — WEIGHT: 182 LBS | BODY MASS INDEX: 26.96 KG/M2 | HEIGHT: 69 IN
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
## 2022-12-01 DIAGNOSIS — Z85.038: ICD-10-CM
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
## 2022-12-01 DIAGNOSIS — I10: ICD-10-CM

## 2022-12-01 DIAGNOSIS — S00.11XA: Primary | ICD-10-CM

## 2022-12-01 DIAGNOSIS — E03.9: ICD-10-CM
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
## 2022-12-01 DIAGNOSIS — Z98.0: ICD-10-CM

## 2022-12-01 DIAGNOSIS — E11.9: ICD-10-CM

## 2022-12-01 PROCEDURE — 70450 CT HEAD/BRAIN W/O DYE: CPT

## 2022-12-01 PROCEDURE — 99283 EMERGENCY DEPT VISIT LOW MDM: CPT

## 2022-12-01 PROCEDURE — 70486 CT MAXILLOFACIAL W/O DYE: CPT

## 2022-12-01 PROCEDURE — 72125 CT NECK SPINE W/O DYE: CPT

## 2025-01-30 NOTE — PROGRESS NOTE
DATE:  09/11/2019  

 

SUBJECTIVE:  The patient is seen at bedside, doing better.  Denies any history of fever,

chills, nausea, or vomiting.  Still has some foul smell to the right foot. 

 

OBJECTIVE:  VITAL SIGNS:  Afebrile, pulse rate 71, respirations 18, blood pressure

137/80, and O2 saturation 94%. 

EXTREMITIES:  Ulceration to 5th metatarsophalangeal joint approximately 2.5 to 3 cm in

diameter.  Some necrosis noted with no bone exposed.  Close to the capsule with some

fibrotic necrotic tissue and foul smell present.  There is still positive cellulitis to

the dorsal aspect right foot resolving slowly.  Pedal pulses diminished to both the DP

and PT. 

 

LABORATORY DATA:  Lab show a white blood cell count of 10.5.

 

ASSESSMENT:  Grade 3/4 ulceration right foot, possible osteo with cellulitis and

diabetic neuropathy and peripheral arterial disease. 

 

PLAN:  We will continue IV antibiotics.  Continue local wound care with Santyl, followed

by diluted wet-to-dry.  Ulceration may need to be debrided once again.  Continue

offloading.  We will continue to follow. 

 

 

 

 

______________________________

NYA Chavez/ELISE

D:  09/11/2019 07:18:28

T:  09/11/2019 13:49:32

Job #:  [id number] n/[id number]